# Patient Record
Sex: FEMALE | Race: WHITE | NOT HISPANIC OR LATINO | Employment: OTHER | URBAN - METROPOLITAN AREA
[De-identification: names, ages, dates, MRNs, and addresses within clinical notes are randomized per-mention and may not be internally consistent; named-entity substitution may affect disease eponyms.]

---

## 2023-10-02 ENCOUNTER — OFFICE VISIT (OUTPATIENT)
Dept: FAMILY MEDICINE | Facility: CLINIC | Age: 58
End: 2023-10-02

## 2023-10-02 VITALS
DIASTOLIC BLOOD PRESSURE: 90 MMHG | HEART RATE: 89 BPM | SYSTOLIC BLOOD PRESSURE: 148 MMHG | HEIGHT: 59 IN | BODY MASS INDEX: 40.33 KG/M2 | WEIGHT: 200.06 LBS | OXYGEN SATURATION: 98 %

## 2023-10-02 DIAGNOSIS — T48.5X5A ADVERSE EFFECT OF DECONGESTANT: ICD-10-CM

## 2023-10-02 DIAGNOSIS — I10 PRIMARY HYPERTENSION: ICD-10-CM

## 2023-10-02 DIAGNOSIS — G89.29 CHRONIC PAIN OF RIGHT KNEE: Primary | ICD-10-CM

## 2023-10-02 DIAGNOSIS — M25.561 CHRONIC PAIN OF RIGHT KNEE: Primary | ICD-10-CM
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
## 2023-10-02 DIAGNOSIS — Z78.9 NONSMOKER: ICD-10-CM

## 2023-10-02 PROCEDURE — 99203 OFFICE O/P NEW LOW 30 MIN: CPT | Mod: PBBFAC,PN | Performed by: FAMILY MEDICINE

## 2023-10-02 PROCEDURE — 99999 PR PBB SHADOW E&M-NEW PATIENT-LVL III: CPT | Mod: PBBFAC,,, | Performed by: FAMILY MEDICINE

## 2023-10-02 PROCEDURE — 99203 PR OFFICE/OUTPT VISIT, NEW, LEVL III, 30-44 MIN: ICD-10-PCS | Mod: S$PBB,,, | Performed by: FAMILY MEDICINE

## 2023-10-02 PROCEDURE — 99203 OFFICE O/P NEW LOW 30 MIN: CPT | Mod: S$PBB,,, | Performed by: FAMILY MEDICINE

## 2023-10-02 PROCEDURE — 99999 PR PBB SHADOW E&M-NEW PATIENT-LVL III: ICD-10-PCS | Mod: PBBFAC,,, | Performed by: FAMILY MEDICINE

## 2023-10-02 RX ORDER — GABAPENTIN 600 MG/1
600 TABLET ORAL 3 TIMES DAILY
Qty: 90 TABLET | Refills: 2 | Status: SHIPPED | OUTPATIENT
Start: 2023-10-02 | End: 2023-11-17

## 2023-10-02 NOTE — PROGRESS NOTES
Subjective     Patient ID: Kaylie Madden is a 58 y.o. female.    Chief Complaint: Establish Care    Acute on chronic right knee pain: states gained weight, which is exacerbating her pain. 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  Patient wants to make it clear that she had an addiction to opioids.  She has weaned herself off implants to remain that way    Nasal congestion x 2+ weeks: using Afrin daily for more than 3 days. Using mucinex.  States sinuses get better than the get clogged again, so then she goes back to using Afrin.     Obesity:  Has gained weight since quitting her job and knows that is contributing to pain in her knee.  Patient is interested in managing her diet to achieve weight loss, but is not sure as to what program she should do.    Elevated blood pressure reading:  States blood pressure is most likely elevated due to pain.  States she was on medication in past but was able to come off of it.      Review of Systems   Constitutional:  Negative for activity change, appetite change, chills, diaphoresis, fatigue, fever and unexpected weight change.   HENT:  Positive for nasal congestion.    Respiratory:  Negative for cough, choking and chest tightness.    Cardiovascular:  Negative for chest pain, palpitations, leg swelling and claudication.   Gastrointestinal:  Negative for abdominal pain, change in bowel habit, constipation, diarrhea, nausea and vomiting.   Musculoskeletal:  Positive for arthralgias, leg pain and myalgias.   Psychiatric/Behavioral:  Negative for dysphoric mood, self-injury, sleep disturbance and suicidal ideas. The patient is not nervous/anxious.           Objective     Physical Exam       Assessment and Plan     1. Chronic pain of right knee    2. Adverse effect of decongestant    3. Primary hypertension    4. BMI 40.0-44.9, adult    5. Nonsmoker    6. Injury caused by bending, sequela    Other orders  -     gabapentin (NEURONTIN)  600 MG tablet; Take 1 tablet (600 mg total) by mouth 3 (three) times daily.  Dispense: 90 tablet; Refill: 2      Patient is currently uninsured.  States to have insurance within the next 3 weeks.  We will order x-ray when patient has insurance.  Refilled Neurontin helps his pain in the interim.     discussed dietary modifications and that caloric restrictions are more important that exercise alone as one may never burn off more calories than they are consuming daily. Advised to focus on cutting calories, joining weight watchers and other programs that help get control of dietary intake.    Risks, benefits, and side effects were discussed with the patient. All questions were answered to the fullest satisfaction of the patient, and pt verbalized understanding and agreement to treatment plan. Pt was to call with any new or worsening symptoms, or present to the ER.  Return to clinic once she has insurance to continue care       Maite Mccoy MD  Family Medicine Physician   [facility]     This note was created using M*Modal voice recognition software that occasionally may misinterpret phrases or words.

## 2023-11-17 ENCOUNTER — OFFICE VISIT (OUTPATIENT)
Dept: FAMILY MEDICINE | Facility: CLINIC | Age: 58
End: 2023-11-17

## 2023-11-17 ENCOUNTER — TELEPHONE (OUTPATIENT)
Dept: FAMILY MEDICINE | Facility: CLINIC | Age: 58
End: 2023-11-17

## 2023-11-17 VITALS
OXYGEN SATURATION: 97 % | HEART RATE: 89 BPM | SYSTOLIC BLOOD PRESSURE: 170 MMHG | DIASTOLIC BLOOD PRESSURE: 70 MMHG | HEIGHT: 59 IN | WEIGHT: 196.63 LBS | BODY MASS INDEX: 39.64 KG/M2

## 2023-11-17 DIAGNOSIS — M17.11 PRIMARY OSTEOARTHRITIS OF RIGHT KNEE: ICD-10-CM

## 2023-11-17 DIAGNOSIS — Z78.9 NONSMOKER: ICD-10-CM

## 2023-11-17 DIAGNOSIS — I10 PRIMARY HYPERTENSION: Primary | ICD-10-CM
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
## 2023-11-17 PROCEDURE — 99999 PR PBB SHADOW E&M-EST. PATIENT-LVL III: CPT | Mod: PBBFAC,,, | Performed by: FAMILY MEDICINE

## 2023-11-17 PROCEDURE — 99214 PR OFFICE/OUTPT VISIT, EST, LEVL IV, 30-39 MIN: ICD-10-PCS | Mod: S$PBB,,, | Performed by: FAMILY MEDICINE

## 2023-11-17 PROCEDURE — 99213 OFFICE O/P EST LOW 20 MIN: CPT | Mod: PBBFAC,PN | Performed by: FAMILY MEDICINE

## 2023-11-17 PROCEDURE — 99214 OFFICE O/P EST MOD 30 MIN: CPT | Mod: S$PBB,,, | Performed by: FAMILY MEDICINE

## 2023-11-17 PROCEDURE — 99999 PR PBB SHADOW E&M-EST. PATIENT-LVL III: ICD-10-PCS | Mod: PBBFAC,,, | Performed by: FAMILY MEDICINE

## 2023-11-17 RX ORDER — LISINOPRIL 20 MG/1
20 TABLET ORAL EVERY MORNING
COMMUNITY
End: 2023-11-29

## 2023-11-17 RX ORDER — GABAPENTIN 800 MG/1
800 TABLET ORAL 3 TIMES DAILY
Qty: 90 TABLET | Refills: 5 | Status: SHIPPED | OUTPATIENT
Start: 2023-11-17 | End: 2023-11-29 | Stop reason: SDUPTHER

## 2023-11-17 NOTE — TELEPHONE ENCOUNTER
----- Message from Caro Pastrana sent at 2023  2:29 PM CST -----  Regarding: advise  Contact: patient  Type: Needs Medical Advice    Who Called:  Patient    Symptoms (please be specific):      How long has patient had these symptoms:        Pharmacy name and phone #:      Best Call Back Number: [phone]    Additional Information: went to a  left it out of town ( neurotin ) needs a refill without if causing bp to rise call to advise thanks!

## 2023-11-17 NOTE — PROGRESS NOTES
Subjective     Patient ID: Kaylie Madden is a 58 y.o. female.    Chief Complaint: Discuss blood pressure and SUZY knee pain    HTN: bp has been elevated  lisinopril 20 mg yesterday took a dose today at 7:30. Pt feeling slightly dizzy when she leans over, but no chest pain or sob.     Chronic knee pain & obesity: dieting and trying to lose weight. States gabapentin helps tremendously, but she left her rx out of town last week and pharmacy will not let her fill early.     Obesity: walking 7 miles now, using weights on carnivore diet, but is wondering what type of diet she should be one.       Review of Systems   Constitutional:  Negative for activity change, appetite change, chills, diaphoresis, fatigue, fever and unexpected weight change.   Respiratory:  Negative for cough, choking and chest tightness.    Cardiovascular:  Negative for chest pain, palpitations, leg swelling and claudication.   Gastrointestinal:  Negative for abdominal pain, change in bowel habit, constipation, diarrhea, nausea and vomiting.   Musculoskeletal:  Positive for arthralgias, leg pain and myalgias.   Neurological:  Positive for dizziness.   Psychiatric/Behavioral:  Negative for dysphoric mood, self-injury, sleep disturbance and suicidal ideas. The patient is not nervous/anxious.           Objective     Physical Exam  Vitals reviewed.   Constitutional:       General: She is not in acute distress.     Appearance: Normal appearance. She is normal weight. She is not ill-appearing or toxic-appearing.   Cardiovascular:      Rate and Rhythm: Normal rate and regular rhythm.      Heart sounds: Normal heart sounds.   Pulmonary:      Effort: Pulmonary effort is normal.      Breath sounds: Normal breath sounds.   Musculoskeletal:         General: Tenderness (right knee) present.   Neurological:      General: No focal deficit present.      Mental Status: She is alert and oriented to person, place, and time.      Gait: Gait abnormal (limping.).    Psychiatric:         Mood and Affect: Mood normal.         Behavior: Behavior normal.            Assessment and Plan     1. Primary hypertension    2. Primary osteoarthritis of right knee    3. BMI 39.0-39.9,adult    4. Injury caused by bending, sequela    5. Nonsmoker    Other orders  -     gabapentin (NEURONTIN) 800 MG tablet; Take 1 tablet (800 mg total) by mouth 3 (three) times daily.  Dispense: 90 tablet; Refill: 5      Advised to increase lisinopril to 20 mg daily and send a portal message next week on how blood pressures are running at home.    Increase Neurontin to 800 mg   discussed dietary modifications. Advised to focus on cutting calories, joining weight watchers and other programs that help get control of dietary intake rather then elimination diets as they are harder to sustain.   Patient states she should have insurance at the beginning of next year  Risks, benefits, and side effects were discussed with the patient. All questions were answered to the fullest satisfaction of the patient, and pt verbalized understanding and agreement to treatment plan. Pt was to call with any new or worsening symptoms, or present to the ER.         Maite Mccoy MD  Family Medicine Physician   [facility]     This note was created using M*Taykey voice recognition software that occasionally may misinterpret phrases or words.

## 2023-11-28 ENCOUNTER — TELEPHONE (OUTPATIENT)
Dept: FAMILY MEDICINE | Facility: CLINIC | Age: 58
End: 2023-11-28

## 2023-11-28 NOTE — TELEPHONE ENCOUNTER
Attempted to reach pt 2X. Phone rang multiple times and sounded like someone answered, but no response each time.

## 2023-11-28 NOTE — TELEPHONE ENCOUNTER
----- Message from Michelle Ivory sent at 11/28/2023 12:40 PM CST -----  Type:  Same Day Appointment Request    Caller is requesting a same day appointment.  Caller declined first available appointment listed below.      Name of Caller:  patient  When is the first available appointment?  tomorrow  Symptoms:  knees hurting, elevated BP  Best Call Back Number:  [phone] (home)   Additional Information:   would like to speak to nurse today

## 2023-11-29 ENCOUNTER — TELEPHONE (OUTPATIENT)
Dept: FAMILY MEDICINE | Facility: CLINIC | Age: 58
End: 2023-11-29

## 2023-11-29 ENCOUNTER — OFFICE VISIT (OUTPATIENT)
Dept: FAMILY MEDICINE | Facility: CLINIC | Age: 58
End: 2023-11-29

## 2023-11-29 VITALS
BODY MASS INDEX: 39.72 KG/M2 | HEART RATE: 88 BPM | OXYGEN SATURATION: 100 % | HEIGHT: 59 IN | SYSTOLIC BLOOD PRESSURE: 150 MMHG | DIASTOLIC BLOOD PRESSURE: 90 MMHG | WEIGHT: 197.06 LBS

## 2023-11-29 DIAGNOSIS — M25.561 CHRONIC PAIN OF RIGHT KNEE: ICD-10-CM
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
## 2023-11-29 DIAGNOSIS — Z78.9 NONSMOKER: ICD-10-CM

## 2023-11-29 DIAGNOSIS — G89.29 CHRONIC PAIN OF RIGHT KNEE: ICD-10-CM

## 2023-11-29 DIAGNOSIS — I10 PRIMARY HYPERTENSION: Primary | ICD-10-CM
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
## 2023-11-29 PROCEDURE — 99214 PR OFFICE/OUTPT VISIT, EST, LEVL IV, 30-39 MIN: ICD-10-PCS | Mod: S$PBB,,, | Performed by: FAMILY MEDICINE

## 2023-11-29 PROCEDURE — 99999 PR PBB SHADOW E&M-EST. PATIENT-LVL III: ICD-10-PCS | Mod: PBBFAC,,, | Performed by: FAMILY MEDICINE

## 2023-11-29 PROCEDURE — 99214 OFFICE O/P EST MOD 30 MIN: CPT | Mod: S$PBB,,, | Performed by: FAMILY MEDICINE

## 2023-11-29 PROCEDURE — 99213 OFFICE O/P EST LOW 20 MIN: CPT | Mod: PBBFAC,PN | Performed by: FAMILY MEDICINE

## 2023-11-29 PROCEDURE — 99999 PR PBB SHADOW E&M-EST. PATIENT-LVL III: CPT | Mod: PBBFAC,,, | Performed by: FAMILY MEDICINE

## 2023-11-29 RX ORDER — LISINOPRIL AND HYDROCHLOROTHIAZIDE 12.5; 2 MG/1; MG/1
2 TABLET ORAL DAILY
Qty: 180 TABLET | Refills: 1 | Status: SHIPPED | OUTPATIENT
Start: 2023-11-29

## 2023-11-29 RX ORDER — LISINOPRIL AND HYDROCHLOROTHIAZIDE 12.5; 2 MG/1; MG/1
2 TABLET ORAL DAILY
Qty: 180 TABLET | Refills: 1 | Status: SHIPPED | OUTPATIENT
Start: 2023-11-29 | End: 2023-11-29 | Stop reason: SDUPTHER

## 2023-11-29 RX ORDER — GABAPENTIN 600 MG/1
600 TABLET ORAL DAILY
Qty: 30 TABLET | Refills: 4 | Status: SHIPPED | OUTPATIENT
Start: 2023-11-29 | End: 2023-12-06

## 2023-11-29 RX ORDER — GABAPENTIN 800 MG/1
800 TABLET ORAL NIGHTLY
Qty: 30 TABLET | Refills: 5 | Status: SHIPPED | OUTPATIENT
Start: 2023-11-29 | End: 2023-12-06 | Stop reason: SDUPTHER

## 2023-11-29 RX ORDER — TRAMADOL HYDROCHLORIDE 50 MG/1
50 TABLET ORAL EVERY 12 HOURS PRN
Qty: 6 TABLET | Refills: 0 | Status: SHIPPED | OUTPATIENT
Start: 2023-11-29 | End: 2023-12-06

## 2023-11-29 NOTE — PROGRESS NOTES
Subjective     Patient ID: Kaylie Madden is a 58 y.o. female.    Chief Complaint: SUZY knee pain and Hypertension    Hypertension:  Patient states she is taking 20 mg of lisinopril, but blood pressure remains elevated over 140/90.  Patient denies any chest pain, shortness for breath or difficulty breathing.    Chronic right knee pain:  Patient would like to try to take 600 mg of gabapentin during the day and 800 mg of gabapentin at night.  States gabapentin does help with the pain, but the 800 mg causes too much sedation      Review of Systems   Constitutional:  Negative for activity change, appetite change, chills, diaphoresis, fatigue, fever and unexpected weight change.   HENT:  Negative for nasal congestion.    Respiratory:  Negative for cough, choking and chest tightness.    Cardiovascular:  Negative for chest pain, palpitations, leg swelling and claudication.   Gastrointestinal:  Negative for abdominal pain, change in bowel habit, constipation, diarrhea, nausea and vomiting.   Musculoskeletal:  Positive for arthralgias, leg pain and myalgias.   Psychiatric/Behavioral:  Negative for dysphoric mood, self-injury, sleep disturbance and suicidal ideas. The patient is not nervous/anxious.           Objective     Physical Exam  Vitals reviewed.   Constitutional:       General: She is not in acute distress.     Appearance: Normal appearance. She is normal weight. She is not ill-appearing or toxic-appearing.   Cardiovascular:      Rate and Rhythm: Normal rate and regular rhythm.      Heart sounds: Normal heart sounds.   Pulmonary:      Effort: Pulmonary effort is normal.      Breath sounds: Normal breath sounds.   Musculoskeletal:         General: Tenderness (right knee) present.   Neurological:      General: No focal deficit present.      Mental Status: She is alert and oriented to person, place, and time.      Gait: Gait abnormal (limping.).   Psychiatric:         Mood and Affect: Mood normal.         Behavior:  Behavior normal.            Assessment and Plan     1. Primary hypertension    2. Chronic pain of right knee    3. Injury caused by bending, sequela    Other orders  -     lisinopriL-hydrochlorothiazide (PRINZIDE,ZESTORETIC) 20-12.5 mg per tablet; Take 2 tablets by mouth once daily.  Dispense: 180 tablet; Refill: 1  -     gabapentin (NEURONTIN) 600 MG tablet; Take 1 tablet (600 mg total) by mouth once daily. And take one 800 mg gabapentin at night  Dispense: 30 tablet; Refill: 4  -     gabapentin (NEURONTIN) 800 MG tablet; Take 1 tablet (800 mg total) by mouth nightly. Take 600 mg of gabapentin during the day.  Dispense: 30 tablet; Refill: 5      Stop lisinopril.  We will add Zestoretic.  Continue check blood pressure at home  We will write for 600 mg of Neurontin during the day and 800 mg of Neurontin at bedtime  Risks, benefits, and side effects were discussed with the patient. All questions were answered to the fullest satisfaction of the patient, and pt verbalized understanding and agreement to treatment plan. Pt was to call with any new or worsening symptoms, or present to the ER.  Return to clinic one-week blood pressure check       Maite Mccoy MD  Family Medicine Physician   [facility]     This note was created using M*Senscient voice recognition software that occasionally may misinterpret phrases or words.       Addendum:   reviewed patient has filled a total of 270 tablets gabapentin over the past month.  Advised patient that I had not checked her  this was not aware of the quantity that she had.  Patient states she threw away those bottles.  Advised patient that no pharmacy is going to fill her prescription of Norco with that most recent fill amount.  Patient asked about tramadol.  Advised patient that I did not want to get her addicted to pain medications as she has stated she is been addicted in the past.  Patient states she just wants some to help get the pain down so that she  does not have a stroke or heart attack.  Advised patient that I am treating her blood pressure to hopefully prevent having a stroke or heart attack.  Advised patient that I will write 3 days' worth of tramadol to take p.r.n. severe pain.  Patient's insurance is supposed to become active on Friday we will then place a referral appointment for her to see ortho on Monday.  Patient verbalized understanding

## 2023-11-29 NOTE — ADDENDUM NOTE
Addended by: GIBRAN SÁNCHEZ on: 11/29/2023 04:32 PM     Modules accepted: Orders, Level of Service

## 2023-11-29 NOTE — TELEPHONE ENCOUNTER
----- Message from Lilian Lebron sent at 11/29/2023  4:05 PM CST -----  Type: Needs Medical Advice  Who Called:  pt     Best Call Back Number: [phone] (home)     Additional Information: pt checking on status of prescription please advise       [facility] [place] [address] [address] [facility]  [address] [address] [place] [address]  Phone: [phone] Fax: [phone]

## 2023-12-06 ENCOUNTER — TELEPHONE (OUTPATIENT)
Dept: FAMILY MEDICINE | Facility: CLINIC | Age: 58
End: 2023-12-06

## 2023-12-06 ENCOUNTER — OFFICE VISIT (OUTPATIENT)
Dept: FAMILY MEDICINE | Facility: CLINIC | Age: 58
End: 2023-12-06

## 2023-12-06 VITALS
OXYGEN SATURATION: 98 % | BODY MASS INDEX: 40.07 KG/M2 | HEART RATE: 72 BPM | HEIGHT: 59 IN | WEIGHT: 198.75 LBS | SYSTOLIC BLOOD PRESSURE: 155 MMHG | DIASTOLIC BLOOD PRESSURE: 80 MMHG
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
## 2023-12-06 DIAGNOSIS — I10 PRIMARY HYPERTENSION: Primary | ICD-10-CM

## 2023-12-06 DIAGNOSIS — Z12.31 OTHER SCREENING MAMMOGRAM: ICD-10-CM

## 2023-12-06 DIAGNOSIS — G89.29 CHRONIC PAIN OF RIGHT KNEE: ICD-10-CM

## 2023-12-06 DIAGNOSIS — I10 PRIMARY HYPERTENSION: ICD-10-CM

## 2023-12-06 DIAGNOSIS — Z78.9 NONSMOKER: ICD-10-CM

## 2023-12-06 DIAGNOSIS — M25.561 CHRONIC PAIN OF RIGHT KNEE: ICD-10-CM

## 2023-12-06 PROCEDURE — 99999 PR PBB SHADOW E&M-EST. PATIENT-LVL III: ICD-10-PCS | Mod: PBBFAC,,, | Performed by: FAMILY MEDICINE

## 2023-12-06 PROCEDURE — 99213 OFFICE O/P EST LOW 20 MIN: CPT | Mod: PBBFAC,PN | Performed by: FAMILY MEDICINE

## 2023-12-06 PROCEDURE — 99214 PR OFFICE/OUTPT VISIT, EST, LEVL IV, 30-39 MIN: ICD-10-PCS | Mod: S$PBB,,, | Performed by: FAMILY MEDICINE

## 2023-12-06 PROCEDURE — 99214 OFFICE O/P EST MOD 30 MIN: CPT | Mod: S$PBB,,, | Performed by: FAMILY MEDICINE

## 2023-12-06 PROCEDURE — 99999 PR PBB SHADOW E&M-EST. PATIENT-LVL III: CPT | Mod: PBBFAC,,, | Performed by: FAMILY MEDICINE

## 2023-12-06 RX ORDER — GABAPENTIN 800 MG/1
800 TABLET ORAL 3 TIMES DAILY
Qty: 90 TABLET | Refills: 2 | Status: SHIPPED | OUTPATIENT
Start: 2023-12-08 | End: 2024-01-17

## 2023-12-06 RX ORDER — AMLODIPINE BESYLATE 10 MG/1
10 TABLET ORAL DAILY
Qty: 30 TABLET | Refills: 1 | Status: SHIPPED | OUTPATIENT
Start: 2023-12-06 | End: 2024-12-05

## 2023-12-06 NOTE — PROGRESS NOTES
Subjective     Patient ID: Kaylie Madden is a 58 y.o. female.    Chief Complaint: Follow-up and SUZY knee pain    Follow up    Hypertension: Compliant with Zestoretic.  States her knees hurt her feel much that is probably why her blood pressure is elevated.  States she did try the Ultram and it did not help with pain like gabapentin does.  Patient is here today to see if she can resume gabapentin.  Patient still states that the plan is that she will have commercial insurance by January 1st      Review of Systems   Constitutional:  Negative for activity change, appetite change, chills, diaphoresis, fatigue, fever and unexpected weight change.   HENT:  Negative for nasal congestion.    Respiratory:  Negative for cough, choking and chest tightness.    Cardiovascular:  Negative for chest pain, palpitations, leg swelling and claudication.   Gastrointestinal:  Negative for abdominal pain, change in bowel habit, constipation, diarrhea, nausea and vomiting.   Musculoskeletal:  Positive for arthralgias, leg pain and myalgias.   Psychiatric/Behavioral:  Negative for dysphoric mood, self-injury, sleep disturbance and suicidal ideas. The patient is not nervous/anxious.           Objective     Physical Exam  Vitals reviewed.   Constitutional:       General: She is not in acute distress.     Appearance: Normal appearance. She is normal weight. She is not ill-appearing or toxic-appearing.   Cardiovascular:      Rate and Rhythm: Normal rate and regular rhythm.      Heart sounds: Normal heart sounds.   Pulmonary:      Effort: Pulmonary effort is normal.      Breath sounds: Normal breath sounds.   Musculoskeletal:         General: Tenderness (right knee) present.   Neurological:      General: No focal deficit present.      Mental Status: She is alert and oriented to person, place, and time.      Gait: Gait abnormal (limping.).   Psychiatric:         Mood and Affect: Mood normal.         Behavior: Behavior normal.             Assessment and Plan     1. Primary hypertension    2. Chronic pain of right knee    3. Injury caused by bending, sequela    4. Nonsmoker    Other orders  -     amLODIPine (NORVASC) 10 MG tablet; Take 1 tablet (10 mg total) by mouth once daily.  Dispense: 30 tablet; Refill: 1  -     gabapentin (NEURONTIN) 800 MG tablet; Take 1 tablet (800 mg total) by mouth 3 (three) times daily. Take 600 mg of gabapentin during the day.  Dispense: 90 tablet; Refill: 2      We will add Norvasc to blood pressure regimen   reviewed technically patient would be eligible for refill on Neurontin next Friday we will write prescription for 800 mg today to see if a pharmacy would be willing to fill early.  Advised it will still be up to discretion of the pharmacy as to whether they refill early.  Again plan is to get x-rays and do further interventions once she has insurance on January 1st.  Return to clinic next week for nurse visit blood pressure check   Return to office January 8th to continue care under insurance.  Risks, benefits, and side effects were discussed with the patient. All questions were answered to the fullest satisfaction of the patient, and pt verbalized understanding and agreement to treatment plan. Pt was to call with any new or worsening symptoms, or present to the ER.         Maite Mccoy MD  Family Medicine Physician   [facility]     This note was created using M*Editlite voice recognition software that occasionally may misinterpret phrases or words.

## 2023-12-06 NOTE — TELEPHONE ENCOUNTER
Received message from call center. Pt called asking if she dropped her phone  cord in the office. Checked the room pt was in and the lobby. No cord found. Pt aware.

## 2023-12-06 NOTE — PROGRESS NOTES
Subjective     Patient ID: Kaylie Madden is a 58 y.o. female.    Chief Complaint: Follow-up and SUZY knee pain    HPI  Review of Systems       Objective     Physical Exam       Assessment and Plan     1. Primary hypertension        ***         No follow-ups on file.

## 2023-12-19 ENCOUNTER — OFFICE VISIT (OUTPATIENT)
Dept: FAMILY MEDICINE | Facility: CLINIC | Age: 58
End: 2023-12-19

## 2023-12-19 ENCOUNTER — TELEPHONE (OUTPATIENT)
Dept: FAMILY MEDICINE | Facility: CLINIC | Age: 58
End: 2023-12-19

## 2023-12-19 VITALS
HEIGHT: 59 IN | DIASTOLIC BLOOD PRESSURE: 94 MMHG | WEIGHT: 201.25 LBS | BODY MASS INDEX: 40.57 KG/M2 | OXYGEN SATURATION: 100 % | SYSTOLIC BLOOD PRESSURE: 156 MMHG | HEART RATE: 87 BPM

## 2023-12-19 DIAGNOSIS — M25.561 CHRONIC PAIN OF RIGHT KNEE: ICD-10-CM
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
## 2023-12-19 DIAGNOSIS — Z78.9 NONSMOKER: ICD-10-CM

## 2023-12-19 DIAGNOSIS — I10 PRIMARY HYPERTENSION: Primary | ICD-10-CM

## 2023-12-19 DIAGNOSIS — G89.29 CHRONIC PAIN OF RIGHT KNEE: ICD-10-CM

## 2023-12-19 DIAGNOSIS — M25.562 CHRONIC PAIN OF LEFT KNEE: ICD-10-CM

## 2023-12-19 DIAGNOSIS — G89.29 CHRONIC PAIN OF LEFT KNEE: ICD-10-CM

## 2023-12-19 DIAGNOSIS — Z91.148 NONCOMPLIANCE WITH MEDICATION TREATMENT DUE TO ABUSE OF MEDICATION: ICD-10-CM

## 2023-12-19 PROCEDURE — 99999 PR PBB SHADOW E&M-EST. PATIENT-LVL IV: CPT | Mod: PBBFAC,,, | Performed by: FAMILY MEDICINE

## 2023-12-19 PROCEDURE — 99999 PR PBB SHADOW E&M-EST. PATIENT-LVL IV: ICD-10-PCS | Mod: PBBFAC,,, | Performed by: FAMILY MEDICINE

## 2023-12-19 PROCEDURE — 99214 OFFICE O/P EST MOD 30 MIN: CPT | Mod: S$PBB,,, | Performed by: FAMILY MEDICINE

## 2023-12-19 PROCEDURE — 99214 OFFICE O/P EST MOD 30 MIN: CPT | Mod: PBBFAC,PN | Performed by: FAMILY MEDICINE

## 2023-12-19 PROCEDURE — 99214 PR OFFICE/OUTPT VISIT, EST, LEVL IV, 30-39 MIN: ICD-10-PCS | Mod: S$PBB,,, | Performed by: FAMILY MEDICINE

## 2023-12-19 RX ORDER — GABAPENTIN 400 MG/1
400 CAPSULE ORAL DAILY
Qty: 30 CAPSULE | Refills: 0 | Status: SHIPPED | OUTPATIENT
Start: 2023-12-19 | End: 2024-01-17

## 2023-12-19 RX ORDER — SPIRONOLACTONE 25 MG/1
25 TABLET ORAL DAILY
Qty: 30 TABLET | Refills: 1 | Status: SHIPPED | OUTPATIENT
Start: 2023-12-19 | End: 2024-02-14

## 2023-12-19 NOTE — TELEPHONE ENCOUNTER
Spoke with jude camacho. Paulino states that pt calls asking for early fill on her dog's xanax as well. State she called there this morning asking for refill on neurontin. Advised him to cancel my refills on file as I will not be prescribing to her in future.

## 2023-12-19 NOTE — TELEPHONE ENCOUNTER
Mariela Sesay,  Please review message below from the pharmacist-Paulino Woodard from [employer] concerning this patient.  States you can give him a call on this matter.  Call placed to [facility] due to msg left, spoke w/pharmacist-Paulino Woodard states pt has a substance abuse issues.   Last office visit: 12/19/2023  Thank you.  Signed:  Dio Ann LPN    ----- Message from Sherly Olivas sent at 12/19/2023  9:53 AM CST -----  Contact: [facility]  Type:  Pharmacy Calling to Clarify an RX    Name of Caller:PAULINO   Pharmacy Name: [facility]  Prescription Name: gabapentin (NEURONTIN) 800 MG tablet  What do they need to clarify?: PT  HAS RX FILLED AT MULTIPLE PHARMACIES AND IS NOT BEING TRUTHFUL WITH THE PHARMACY. PT HAS SUBSTANCE ABUSE ISSUES AND IS BELIEVED TO ALSO BE TAKING XANAX. PHARMACY RECOMMENDS DRUG TESTING  PHARMACY HAD THIS ISSUE WITH THE PT IN THE PAST   Best Call Back Number: [phone]   Additional Information: THANK YOU

## 2023-12-20 ENCOUNTER — PATIENT MESSAGE (OUTPATIENT)
Dept: FAMILY MEDICINE | Facility: CLINIC | Age: 58
End: 2023-12-20

## 2023-12-21 PROBLEM — Z91.148 NONCOMPLIANCE WITH MEDICATION TREATMENT DUE TO ABUSE OF MEDICATION: Status: ACTIVE | Noted: 2023-12-21

## 2023-12-21 NOTE — PROGRESS NOTES
Subjective     Patient ID: Kaylie Madden is a 58 y.o. female.    Chief Complaint: Hypertension and Foot Swelling    Hypertension and chronic pain syndrome:  Patient states she has been compliant with her blood pressure medications, but she is still in a lot of pain to her blood pressure remains elevated.  States she must a red her instructions on her gabapentin wrong because she has gone through 90 tablets in 2 weeks.  Patient requesting additional refill of gabapentin.  Patient states she is scared also going through withdrawal.    Obesity:  Patient interested in weight loss management and would prefer to stay with a PCP      Review of Systems   Constitutional:  Negative for activity change, appetite change, chills, diaphoresis, fatigue, fever and unexpected weight change.   HENT:  Negative for nasal congestion.    Respiratory:  Negative for cough, choking and chest tightness.    Cardiovascular:  Negative for chest pain, palpitations, leg swelling and claudication.   Gastrointestinal:  Negative for abdominal pain, change in bowel habit, constipation, diarrhea, nausea and vomiting.   Musculoskeletal:  Positive for arthralgias, leg pain and myalgias.   Psychiatric/Behavioral:  Negative for dysphoric mood, self-injury, sleep disturbance and suicidal ideas. The patient is not nervous/anxious.           Objective     Physical Exam  Vitals reviewed.   Constitutional:       General: She is not in acute distress.     Appearance: Normal appearance. She is not ill-appearing or toxic-appearing.   Cardiovascular:      Rate and Rhythm: Normal rate and regular rhythm.      Heart sounds: Normal heart sounds.   Pulmonary:      Effort: Pulmonary effort is normal.      Breath sounds: Normal breath sounds.   Musculoskeletal:         General: Tenderness (right knee) present.   Neurological:      General: No focal deficit present.      Mental Status: She is alert and oriented to person, place, and time.      Gait: Gait abnormal  (limping.).   Psychiatric:         Mood and Affect: Mood normal.         Behavior: Behavior normal.            Assessment and Plan     1. Primary hypertension    2. Chronic pain of right knee  -     Ambulatory referral/consult to Orthopedics; Future; Expected date: 01/08/2024  -     X-Ray Knee 3 View Right; Future; Expected date: 12/19/2023    3. Injury caused by bending, sequela    4. BMI 40.0-44.9, adult    5. Nonsmoker    6. Chronic pain of left knee  -     X-Ray Knee 3 View Left; Future; Expected date: 12/19/2023    7. Noncompliance with medication treatment due to abuse of medication    Other orders  -     gabapentin (NEURONTIN) 400 MG capsule; Take 1 capsule (400 mg total) by mouth once daily.  Dispense: 30 capsule; Refill: 0  -     spironolactone (ALDACTONE) 25 MG tablet; Take 1 tablet (25 mg total) by mouth once daily.  Dispense: 30 tablet; Refill: 1      Advised patient that I will not be writing gabapentin for her anymore.  Advised that given her history of medication abuse she is demonstrating signs of that with gabapentin.  Advised that I understand that she has chronic pain that needs to be addressed, but she might have to wait until she is able to establish with ortho for possible knee in injections, or other interventional approaches as the medication route is leading her down a slippery slope again.  Advised that I will write 400 mg of gabapentin but only a 30 day supply.  Advised her to make those stretch to prevent a withdrawal, but going forward she will have to go without gabapentin.  Advised patient that I do not do weight management, but I can have her establish with a provider at our [place] location to do weight management and continue her care.  Again advised patient that the plan does not change that when she gets insurance in January 1st she should follow up with ortho in obtain x-rays of her knees so that we can continue her care   We will add blood pressure medication   Risks, benefits,  and side effects were discussed with the patient. All questions were answered to the fullest satisfaction of the patient, and pt verbalized understanding and agreement to treatment plan. Pt was to call with any new or worsening symptoms, or present to the ER.  Advised to return to clinic 1 2 week nurse visit blood pressure check       Maite Mccoy MD  Family Medicine Physician   [facility]     This note was created using M*Modal voice recognition software that occasionally may misinterpret phrases or words.

## 2023-12-28 ENCOUNTER — HOSPITAL ENCOUNTER (OUTPATIENT)
Dept: RADIOLOGY | Facility: HOSPITAL | Age: 58
Discharge: HOME | End: 2023-12-28
Attending: FAMILY MEDICINE

## 2023-12-28 DIAGNOSIS — M25.562 CHRONIC PAIN OF LEFT KNEE: ICD-10-CM

## 2023-12-28 DIAGNOSIS — M25.561 CHRONIC PAIN OF RIGHT KNEE: ICD-10-CM

## 2023-12-28 DIAGNOSIS — G89.29 CHRONIC PAIN OF RIGHT KNEE: ICD-10-CM

## 2023-12-28 DIAGNOSIS — Z12.31 OTHER SCREENING MAMMOGRAM: ICD-10-CM

## 2023-12-28 DIAGNOSIS — G89.29 CHRONIC PAIN OF LEFT KNEE: ICD-10-CM

## 2023-12-28 PROCEDURE — 77067 SCR MAMMO BI INCL CAD: CPT | Mod: 26,,, | Performed by: RADIOLOGY

## 2023-12-28 PROCEDURE — 73562 X-RAY EXAM OF KNEE 3: CPT | Mod: 26,RT,, | Performed by: RADIOLOGY

## 2023-12-28 PROCEDURE — 77067 MAMMO DIGITAL SCREENING BILAT WITH TOMO: ICD-10-PCS | Mod: 26,,, | Performed by: RADIOLOGY

## 2023-12-28 PROCEDURE — 73562 X-RAY EXAM OF KNEE 3: CPT | Mod: TC,RT

## 2023-12-28 PROCEDURE — 73562 X-RAY EXAM OF KNEE 3: CPT | Mod: TC,LT

## 2023-12-28 PROCEDURE — 77063 BREAST TOMOSYNTHESIS BI: CPT | Mod: 26,,, | Performed by: RADIOLOGY

## 2023-12-28 PROCEDURE — 77067 SCR MAMMO BI INCL CAD: CPT | Mod: TC

## 2023-12-28 PROCEDURE — 73562 X-RAY EXAM OF KNEE 3: CPT | Mod: 26,LT,, | Performed by: RADIOLOGY

## 2023-12-28 PROCEDURE — 77063 MAMMO DIGITAL SCREENING BILAT WITH TOMO: ICD-10-PCS | Mod: 26,,, | Performed by: RADIOLOGY

## 2023-12-28 PROCEDURE — 73562 XR KNEE 3 VIEW LEFT: ICD-10-PCS | Mod: 26,LT,, | Performed by: RADIOLOGY

## 2024-01-04 ENCOUNTER — TELEPHONE (OUTPATIENT)
Dept: FAMILY MEDICINE | Facility: CLINIC | Age: 59
End: 2024-01-04
Payer: COMMERCIAL

## 2024-01-04 ENCOUNTER — OFFICE VISIT (OUTPATIENT)
Dept: ORTHOPEDICS | Facility: CLINIC | Age: 59
End: 2024-01-04
Payer: COMMERCIAL

## 2024-01-04 VITALS — OXYGEN SATURATION: 99 % | HEART RATE: 86 BPM | HEIGHT: 59 IN | BODY MASS INDEX: 40.52 KG/M2 | WEIGHT: 201 LBS

## 2024-01-04 DIAGNOSIS — M25.561 CHRONIC PAIN OF RIGHT KNEE: ICD-10-CM

## 2024-01-04 DIAGNOSIS — G89.29 CHRONIC PAIN OF RIGHT KNEE: ICD-10-CM

## 2024-01-04 DIAGNOSIS — M17.0 PRIMARY OSTEOARTHRITIS OF BOTH KNEES: Primary | ICD-10-CM

## 2024-01-04 PROCEDURE — 99999 PR PBB SHADOW E&M-EST. PATIENT-LVL IV: CPT | Mod: PBBFAC,,, | Performed by: ORTHOPAEDIC SURGERY

## 2024-01-04 PROCEDURE — 99204 OFFICE O/P NEW MOD 45 MIN: CPT | Mod: 25,S$GLB,, | Performed by: ORTHOPAEDIC SURGERY

## 2024-01-04 PROCEDURE — 1160F RVW MEDS BY RX/DR IN RCRD: CPT | Mod: CPTII,S$GLB,, | Performed by: ORTHOPAEDIC SURGERY

## 2024-01-04 PROCEDURE — 1159F MED LIST DOCD IN RCRD: CPT | Mod: CPTII,S$GLB,, | Performed by: ORTHOPAEDIC SURGERY

## 2024-01-04 PROCEDURE — 20610 DRAIN/INJ JOINT/BURSA W/O US: CPT | Mod: RT,S$GLB,, | Performed by: ORTHOPAEDIC SURGERY

## 2024-01-04 PROCEDURE — 3008F BODY MASS INDEX DOCD: CPT | Mod: CPTII,S$GLB,, | Performed by: ORTHOPAEDIC SURGERY

## 2024-01-04 RX ORDER — TRIAMCINOLONE ACETONIDE 40 MG/ML
40 INJECTION, SUSPENSION INTRA-ARTICULAR; INTRAMUSCULAR
Status: DISCONTINUED | OUTPATIENT
Start: 2024-01-04 | End: 2024-01-04 | Stop reason: HOSPADM

## 2024-01-04 RX ORDER — MELOXICAM 15 MG/1
15 TABLET ORAL DAILY
Qty: 30 TABLET | Refills: 1 | Status: SHIPPED | OUTPATIENT
Start: 2024-01-04 | End: 2024-03-04

## 2024-01-04 RX ADMIN — TRIAMCINOLONE ACETONIDE 40 MG: 40 INJECTION, SUSPENSION INTRA-ARTICULAR; INTRAMUSCULAR at 11:01

## 2024-01-04 NOTE — PROCEDURES
Large Joint Aspiration/Injection: R knee    Date/Time: 1/4/2024 11:30 AM    Performed by: Andrey Negron MD  Authorized by: Andrey Negron MD    Consent Done?:  Yes (Verbal)  Indications:  Pain  Site marked: the procedure site was marked    Timeout: prior to procedure the correct patient, procedure, and site was verified    Local anesthetic: Ropivicaine.  Anesthetic total (ml):  3      Details:  Needle Size:  20 G  Approach:  Anterolateral  Location:  Knee  Site:  R knee  Medications:  40 mg triamcinolone acetonide 40 mg/mL  Patient tolerance:  Patient tolerated the procedure well with no immediate complications

## 2024-01-04 NOTE — LETTER
January 4, 2024      [facility]  [address]  [place] [address]  Phone: [phone]  Fax: [phone]       Patient: Kaylie Madden   YOB: 1965  Date of Visit: 01/04/2024    To Whom It May Concern:    Hermann Madden  was at [facility] on 01/04/2024. The patient may return to work on 01/05/2024 with no restrictions. If you have any questions or concerns, or if I can be of further assistance, please do not hesitate to contact me.    Sincerely,          Yolis Newby LPN

## 2024-01-04 NOTE — TELEPHONE ENCOUNTER
----- Message from Kristian Mckeon sent at 1/4/2024 12:14 PM CST -----  Contact: pt  Type:  Same Day Appointment Request    Caller is requesting a same day appointment.  Caller declined first available appointment listed below.    Name of Caller the patient    When is the first available appointment?1/10  Symptoms:knee pain , sciatica  Best Call Back Number:[phone]   Additional Information: Thanks

## 2024-01-04 NOTE — PROGRESS NOTES
Subjective:      Patient ID: Kaylie Madden is a 58 y.o. female.    Chief Complaint: Pain of the Right Knee and Pain of the Left Knee    HPI  58-year-old female with a long history of bilateral knee pain right greater than left.  Recently seen by her PCP and referred for further evaluation.  Symptoms fail to improve with gabapentin.  She does have some sciatic issues as well.  Her pain is with prolonged standing and walking.  Feels like her knee is going to buckle or give way at times.  Describes an 8/10 level pain.  ROS      Objective:    Ortho Exam     Constitutional:   Patient is alert  and oriented in no acute distress  HEENT:  normocephalic atraumatic; PERRL EOMI  Neck:  Supple without adenopathy  Cardiovascular:  Normal rate and rhythm  Pulmonary:  Normal respiratory effort normal chest wall expansion  Abdominal:  Nonprotuberant nondistended  Musculoskeletal:  Patient has a mildly antalgic gait  She has significant patellofemoral crepitus and diffuse joint line tenderness  Maybe a trace effusion there is no gross instability  Neurological:  No focal defect; cranial nerves 2-12 grossly intact  Psychiatric/behavioral:  Mood and behavior normal           My Radiographs Findings:    Radiographs consistent with moderate to severe degenerative changes with joint space narrowing subchondral sclerosis and osteophyte formation  Assessment:       Encounter Diagnoses   Name Primary?    Chronic pain of right knee     Primary osteoarthritis of both knees Yes         Plan:       I have discussed medical condition treatment options at length.  After a verbal consent and sterile prep I injected her most symptomatic right knee today without complication with a combination of cc Kenalog and several cc of lidocaine.  I have discussed generalized activity restrictions knee rehab exercises I will give her a trial of Mobic GI cardiac and renal precautions were discussed.  Additional medications per PCP P as she requests.  Follow up  in 4-6 weeks sooner if any questions or problems.  Long-term likely candidate for knee replacements.        History reviewed. No pertinent past medical history.  Past Surgical History:   Procedure Laterality Date     SECTION      FOOT SURGERY           Current Outpatient Medications:     amLODIPine (NORVASC) 10 MG tablet, Take 1 tablet (10 mg total) by mouth once daily., Disp: 30 tablet, Rfl: 1    gabapentin (NEURONTIN) 400 MG capsule, Take 1 capsule (400 mg total) by mouth once daily., Disp: 30 capsule, Rfl: 0    gabapentin (NEURONTIN) 800 MG tablet, Take 1 tablet (800 mg total) by mouth 3 (three) times daily. Take 600 mg of gabapentin during the day., Disp: 90 tablet, Rfl: 2    ibuprofen (ADVIL,MOTRIN) 200 MG tablet, Take 2 tablets (400 mg total) by mouth every 6 (six) hours as needed for Pain., Disp: 30 tablet, Rfl: 0    lisinopriL-hydrochlorothiazide (PRINZIDE,ZESTORETIC) 20-12.5 mg per tablet, Take 2 tablets by mouth once daily., Disp: 180 tablet, Rfl: 1    spironolactone (ALDACTONE) 25 MG tablet, Take 1 tablet (25 mg total) by mouth once daily., Disp: 30 tablet, Rfl: 1    Review of patient's allergies indicates:   Allergen Reactions    Metaxalone Nausea And Vomiting    Cephalexin Other (See Comments)    Corticosteroids (glucocorticoids) Other (See Comments)     rage       Family History   Problem Relation Age of Onset    Diabetes Mother     Hypertension Mother      Social History     Occupational History    Not on file   Tobacco Use    Smoking status: Never     Passive exposure: Never    Smokeless tobacco: Never   Substance and Sexual Activity    Alcohol use: No    Drug use: No    Sexual activity: Yes     Partners: Male     Birth control/protection: None

## 2024-01-10 ENCOUNTER — TELEPHONE (OUTPATIENT)
Dept: FAMILY MEDICINE | Facility: CLINIC | Age: 59
End: 2024-01-10
Payer: COMMERCIAL

## 2024-01-10 NOTE — TELEPHONE ENCOUNTER
----- Message from Kasandra Goldstein sent at 1/10/2024  9:22 AM CST -----  Regarding: rt call  Contact: pt  Type:  Patient Returning Call    Who Called:pt  Who Left Message for Patient:Dio SALAZAR  Does the patient know what this is regarding?:yes  Would the patient rather a call back or a response via [facility]? Call back  Best Call Back Number:[phone]    Additional Information: sts she is returning a call

## 2024-01-10 NOTE — TELEPHONE ENCOUNTER
Message sent to  Clinic that patient is running late for appointment.  ----- Message from Shakira Goldstein sent at 1/10/2024  8:40 AM CST -----  Contact: PT  Type: Needs Medical Advice    Who Called: PT  Best Call Back Number: [phone]  Additional  Information: PT requesting a call back regarding she missed her exit and will be late wants to know if she can still  be seen. Also pt's insurance is out of network states info was not disclosed to her.  Please Advise- Thank you

## 2024-01-11 ENCOUNTER — TELEPHONE (OUTPATIENT)
Dept: FAMILY MEDICINE | Facility: CLINIC | Age: 59
End: 2024-01-11
Payer: COMMERCIAL

## 2024-01-11 NOTE — TELEPHONE ENCOUNTER
----- Message from Sherly Olivas sent at 1/11/2024 11:15 AM CST -----  Contact: PT  Type:  NURSE APPT REQUEST     Who Called: PT   Symptoms (please be specific): PT WAS INSTRUCTED BY [facility] TO HAVE STICHES REMOVED IN 5 DAYS. STICHES WERE PUT IN ON MONDAY 01/08/23  PT NEEDS A APPT TO REMOVE STICHES   IF UNABLE  TO CHERYL AN APPT WITHIN THE TIME FRAME PT WOULD LIKE TO KNOW IF SHE CAN REMOVE THE STICHES HERSELF?   Would the patient rather a call back or a response via [facility]? CALL   Best Call Back Number: [phone] (home)   Additional Information: THANK YOU

## 2024-01-12 ENCOUNTER — OFFICE VISIT (OUTPATIENT)
Dept: FAMILY MEDICINE | Facility: CLINIC | Age: 59
End: 2024-01-12
Payer: COMMERCIAL

## 2024-01-12 VITALS
TEMPERATURE: 98 F | HEART RATE: 71 BPM | HEIGHT: 59 IN | BODY MASS INDEX: 38.38 KG/M2 | OXYGEN SATURATION: 95 % | DIASTOLIC BLOOD PRESSURE: 86 MMHG | SYSTOLIC BLOOD PRESSURE: 144 MMHG | WEIGHT: 190.38 LBS

## 2024-01-12 DIAGNOSIS — Z48.89 SUTURE CHECK: Primary | ICD-10-CM

## 2024-01-12 PROCEDURE — 1159F MED LIST DOCD IN RCRD: CPT | Mod: CPTII,S$GLB,, | Performed by: STUDENT IN AN ORGANIZED HEALTH CARE EDUCATION/TRAINING PROGRAM

## 2024-01-12 PROCEDURE — 1160F RVW MEDS BY RX/DR IN RCRD: CPT | Mod: CPTII,S$GLB,, | Performed by: STUDENT IN AN ORGANIZED HEALTH CARE EDUCATION/TRAINING PROGRAM

## 2024-01-12 PROCEDURE — 3008F BODY MASS INDEX DOCD: CPT | Mod: CPTII,S$GLB,, | Performed by: STUDENT IN AN ORGANIZED HEALTH CARE EDUCATION/TRAINING PROGRAM

## 2024-01-12 PROCEDURE — 99213 OFFICE O/P EST LOW 20 MIN: CPT | Mod: S$GLB,,, | Performed by: STUDENT IN AN ORGANIZED HEALTH CARE EDUCATION/TRAINING PROGRAM

## 2024-01-12 PROCEDURE — 3077F SYST BP >= 140 MM HG: CPT | Mod: CPTII,S$GLB,, | Performed by: STUDENT IN AN ORGANIZED HEALTH CARE EDUCATION/TRAINING PROGRAM

## 2024-01-12 PROCEDURE — 3079F DIAST BP 80-89 MM HG: CPT | Mod: CPTII,S$GLB,, | Performed by: STUDENT IN AN ORGANIZED HEALTH CARE EDUCATION/TRAINING PROGRAM

## 2024-01-12 NOTE — PROGRESS NOTES
[facility] - [place], [place]    Family Medicine Office Visit    Subjective     Patient ID: Kaylie Madden is a 58 y.o. female who presents to clinic today to follow up.     Medical history, surgical history, medications, allergies, and social history were reviewed and updated.     Chief Complaint: Follow-up (Suture removal )    HPI    Suture check  Fell at work on 1/8 and had an injury to her finger. Went to [facility] ED. Needed to have stiches out in 5 days per ER physician. X-rays showed a small fracture of ring finger. Has been wearing a splint.     Vitals:    01/12/24 1039   BP: (!) 144/86   Pulse: 71   Temp: 97.5 °F (36.4 °C)      Wt Readings from Last 3 Encounters:   01/12/24 1039 86.4 kg (190 lb 6.4 oz)   01/04/24 1139 91.2 kg (201 lb)   12/19/23 0755 91.3 kg (201 lb 4.5 oz)      Review of Systems    Review of Systems otherwise negative unless specified above.        Objective     Physical Exam  Constitutional:       General: She is not in acute distress.     Appearance: Normal appearance. She is obese.   HENT:      Head: Normocephalic and atraumatic.      Mouth/Throat:      Mouth: Mucous membranes are moist.   Cardiovascular:      Rate and Rhythm: Normal rate and regular rhythm.      Heart sounds: No murmur heard.  Pulmonary:      Effort: Pulmonary effort is normal. No respiratory distress.      Breath sounds: Normal breath sounds. No wheezing.   Musculoskeletal:      Right hand: Normal range of motion. Normal strength.      Left hand: No bony tenderness. Decreased range of motion.      Comments: Three sutures in place on left ring finger.    Skin:     General: Skin is warm and dry.   Neurological:      General: No focal deficit present.      Mental Status: She is alert and oriented to person, place, and time.   Psychiatric:         Behavior: Behavior normal.            Assessment and Plan     Current Outpatient Medications   Medication Instructions    amLODIPine (NORVASC) 10  mg, Oral, Daily    gabapentin (NEURONTIN) 800 mg, Oral, 3 times daily, Take 600 mg of gabapentin during the day.    gabapentin (NEURONTIN) 400 mg, Oral, Daily    ibuprofen (ADVIL,MOTRIN) 400 mg, Oral, Every 6 hours PRN    lisinopriL-hydrochlorothiazide (PRINZIDE,ZESTORETIC) 20-12.5 mg per tablet 2 tablets, Oral, Daily    meloxicam (MOBIC) 15 mg, Oral, Daily    spironolactone (ALDACTONE) 25 mg, Oral, Daily        1. Suture check        I do not believe that her sutures are ready for removal today.  I believe that she needs a few more days for her finger to continue to heal.  Recommended that she keep the med until her appointment with Dr. Mccoy next week.  Discussed that if she feels that she has not regained full mobility of her hand we could consider occupational therapy versus referral to hand specialist as she may have a fracture on her ring finger.  Reports she has broken this finger before.         Follow up if symptoms worsen or fail to improve, for Keep scheduled appointment with Dr. Mccoy.    Questions were invited and answered. No other acute concerns at this time. Will plan to follow up as above or sooner if needed.     Sade Stroud DO  01/12/2024 10:45 AM

## 2024-01-12 NOTE — PATIENT INSTRUCTIONS
Rakan Lott,     If you are due for any health screening(s) below please notify me so we can arrange them to be ordered and scheduled. Most healthy patients at your age complete them, but you are free to accept or refuse.     If you can't do it, I'll definitely understand. If you can, I'd certainly appreciate it!    Tests to Keep You Healthy    Mammogram: Met on 12/28/2023  Colon Cancer Screening: DUE  Cervical Cancer Screening: DUE  Last Blood Pressure <= 139/89 (1/12/2024): NO      Its time for your colon cancer screening     Colorectal cancer is one of the leading causes of cancer death for men and women but it doesnt have to be. Screenings can prevent colorectal cancer or find it early enough to treat and cure the disease.     Our records indicate that you may be overdue for colon cancer screening. A colonoscopy or stool screening test can help identify patients at risk for developing colon cancer. Cancer screenings save lives, so schedule yours today to stay healthy.     A colonoscopy is the preferred test for detecting colon cancer. It is needed only once every 10 years if results are negative. While you are sedated, a flexible, lighted tube with a tiny camera is inserted into the rectum and advanced through the colon to look for cancers.     An alternative screening test that is used at home and returned to the lab may also be used. It detects hidden blood in bowel movements which could indicate cancer in the colon. If results are positive, you will need a colonoscopy to determine if the blood is a sign of cancer. This type of follow up (diagnostic) colonoscopy usually requires additional copays as required by your insurance provider.     If you recently had your colon cancer screening performed outside of [facility], please let your Health care team know so that they can update your health record. Please contact your PCP if you have any questions.    Your cervical cancer screening is due     Our  records indicate that you may be overdue for your screening Pap smear. A Pap smear is an important health screening that can detect abnormal cells that can become cervical cancer. Cervical cancer screenings allow for early diagnosis and increase the likelihood of successful treatment.     The current recommendation for Pap smear screening is every 3-5 years for women at average risk. We encourage you to schedule your appointment with your [facility] provider. Many women see a gynecologist for this screening, but some primary care providers also provide Pap screening.     If you recently had your Pap smear screening performed outside of [facility], please let your health care team know so that they can update your health record.      Lets manage your high blood pressure     Your blood pressure was above 140/90 today during your visit. We recommend that you schedule a nurse visit in two weeks to check your blood pressure and discuss ways to support your health goals.     You can also manage your health and record your blood pressure from the comfort of home by keeping a daily blood pressure log. These results are shared with and reviewed by your provider. Please print this form (Daily Blood Pressure Log) to assist you in keeping track of your blood pressure at home.     Schedule your nurse visit in two weeks to learn more about how to track and manage high blood pressure.    Daily Blood Pressure Log    Name:__________________________________                  Date of Birth:_________    Average Blood Pressure:  __________      Date: Time  (a.m.) Blood  Pressure: Pulse  Rate: Time  (p.m.) Blood  Pressure : Pulse  Rate:   Sample 8:37 127/83 84

## 2024-01-17 ENCOUNTER — OFFICE VISIT (OUTPATIENT)
Dept: FAMILY MEDICINE | Facility: CLINIC | Age: 59
End: 2024-01-17
Payer: COMMERCIAL

## 2024-01-17 VITALS
WEIGHT: 185.06 LBS | DIASTOLIC BLOOD PRESSURE: 70 MMHG | OXYGEN SATURATION: 99 % | HEART RATE: 84 BPM | HEIGHT: 59 IN | SYSTOLIC BLOOD PRESSURE: 132 MMHG | BODY MASS INDEX: 37.31 KG/M2

## 2024-01-17 DIAGNOSIS — G89.29 CHRONIC PAIN OF RIGHT KNEE: ICD-10-CM

## 2024-01-17 DIAGNOSIS — Z11.59 ENCOUNTER FOR HEPATITIS C SCREENING TEST FOR LOW RISK PATIENT: ICD-10-CM

## 2024-01-17 DIAGNOSIS — Z13.1 SCREENING FOR DIABETES MELLITUS: ICD-10-CM

## 2024-01-17 DIAGNOSIS — M25.561 CHRONIC PAIN OF RIGHT KNEE: ICD-10-CM

## 2024-01-17 DIAGNOSIS — Z12.11 SCREENING FOR COLON CANCER: ICD-10-CM

## 2024-01-17 DIAGNOSIS — Z01.419 ENCOUNTER FOR WELL WOMAN EXAM WITH ROUTINE GYNECOLOGICAL EXAM: Primary | ICD-10-CM

## 2024-01-17 DIAGNOSIS — I10 PRIMARY HYPERTENSION: Primary | ICD-10-CM

## 2024-01-17 DIAGNOSIS — Z48.02 VISIT FOR SUTURE REMOVAL: ICD-10-CM

## 2024-01-17 DIAGNOSIS — Z78.9 NONSMOKER: ICD-10-CM

## 2024-01-17 DIAGNOSIS — Z13.220 SCREENING FOR LIPID DISORDERS: ICD-10-CM

## 2024-01-17 PROCEDURE — 3008F BODY MASS INDEX DOCD: CPT | Mod: CPTII,S$GLB,, | Performed by: FAMILY MEDICINE

## 2024-01-17 PROCEDURE — 3078F DIAST BP <80 MM HG: CPT | Mod: CPTII,S$GLB,, | Performed by: FAMILY MEDICINE

## 2024-01-17 PROCEDURE — 3075F SYST BP GE 130 - 139MM HG: CPT | Mod: CPTII,S$GLB,, | Performed by: FAMILY MEDICINE

## 2024-01-17 PROCEDURE — 99214 OFFICE O/P EST MOD 30 MIN: CPT | Mod: S$GLB,,, | Performed by: FAMILY MEDICINE

## 2024-01-17 PROCEDURE — 1160F RVW MEDS BY RX/DR IN RCRD: CPT | Mod: CPTII,S$GLB,, | Performed by: FAMILY MEDICINE

## 2024-01-17 PROCEDURE — 1159F MED LIST DOCD IN RCRD: CPT | Mod: CPTII,S$GLB,, | Performed by: FAMILY MEDICINE

## 2024-01-17 PROCEDURE — 99024 POSTOP FOLLOW-UP VISIT: CPT | Mod: S$GLB,,, | Performed by: FAMILY MEDICINE

## 2024-01-17 PROCEDURE — 99999 PR PBB SHADOW E&M-EST. PATIENT-LVL III: CPT | Mod: PBBFAC,,, | Performed by: FAMILY MEDICINE

## 2024-01-17 NOTE — PROCEDURES
Suture Removal    Date/Time: 1/17/2024 9:45 AM  Location procedure was performed: formerly [facility]    Performed by: Maite Mccoy MD  Authorized by: Maite Mccoy MD  Assisting provider: Maite Mccoy MD  Pre-operative diagnosis: laceration of hand sutre removal  Post-operative diagnosis: laceration of hand suture removal  Body area: upper extremity  Location details: right index finger  Description of findings: wound edges approximated. dry blood on suture site   Wound Appearance: no drainage, nonpurulent and tender  Sutures Removed: 3  Technical procedures used: n/a  Significant surgical tasks conducted by the assistant(s): n/a  Complications: No  Estimated blood loss (mL): 0  Specimens: No  Implants: No  Patient tolerance: Patient tolerated the procedure well with no immediate complications

## 2024-01-17 NOTE — PROGRESS NOTES
Subjective     Patient ID: Kaylie Madden is a 58 y.o. female.    Chief Complaint: Follow-up (Pt is here for a follow up visit, Fell at work and had to have stiches put in  left ring finger-- stitches have been in now for 10 days. )    HTN: states spironolactone makes her dizzy she takes it in the morning. Takes 3 bp pills in the morning and one at night.     OA knees and chronic pain syndrome: pt states she threw out     Obesity: pt states she's been working on diet and exercise modifications on her own.     ROS: pt states she had pain between both shoulder blades. Nausea and dizziness. States the whole morning           Review of Systems   Constitutional:  Negative for activity change, appetite change, chills, diaphoresis, fatigue, fever and unexpected weight change.   HENT:  Negative for nasal congestion.    Respiratory:  Negative for cough, choking and chest tightness.    Cardiovascular:  Negative for chest pain, palpitations, leg swelling and claudication.   Gastrointestinal:  Negative for abdominal pain, change in bowel habit, constipation, diarrhea, nausea and vomiting.   Musculoskeletal:  Positive for arthralgias, leg pain and myalgias.   Neurological:  Positive for dizziness.   Psychiatric/Behavioral:  Negative for dysphoric mood, self-injury, sleep disturbance and suicidal ideas. The patient is not nervous/anxious.           Objective     Physical Exam  Vitals reviewed.   Constitutional:       General: She is not in acute distress.     Appearance: Normal appearance. She is not ill-appearing or toxic-appearing.   Cardiovascular:      Rate and Rhythm: Normal rate.   Pulmonary:      Effort: Pulmonary effort is normal. No respiratory distress.   Musculoskeletal:        Hands:       Comments: 3 interrupted sutures in left index finger   Neurological:      General: No focal deficit present.      Mental Status: She is alert and oriented to person, place, and time.   Psychiatric:         Mood and Affect: Mood  normal.         Behavior: Behavior normal.            Assessment and Plan     1. Primary hypertension  -     CBC Auto Differential; Future; Expected date: 01/17/2024    2. Nonsmoker    3. Chronic pain of right knee    4. Encounter for hepatitis C screening test for low risk patient  -     Hepatitis C Antibody; Future; Expected date: 01/17/2024    5. Screening for lipid disorders  -     Lipid Panel; Future; Expected date: 01/17/2024    6. BMI 37.0-37.9, adult  -     Lipid Panel; Future; Expected date: 01/17/2024  -     Hemoglobin A1C; Future; Expected date: 01/17/2024    7. Screening for colon cancer  -     Fecal Immunochemical Test (iFOBT); Future; Expected date: 01/17/2024    8. Screening for diabetes mellitus  -     Hemoglobin A1C; Future; Expected date: 01/17/2024    9. Visit for suture removal  -     Suture Removal      We will check labs today   We will refer to gyn for annual Pap smear   We will give patient colon cancer screening fit kit   Advised patient to separate her blood pressure pills to take amlodipine and Zestoretic in the morning and spironolactone and Zestoretic in the evening to see if that reduces the dizziness   Advised patient that I will not refill her gabapentin at this time due to her recent miss handling of gabapentin and her history of drug abuse.  Advised that she can continue to follow with ortho for management of her pain  Risks, benefits, and side effects were discussed with the patient. All questions were answered to the fullest satisfaction of the patient, and pt verbalized understanding and agreement to treatment plan. Pt was to call with any new or worsening symptoms, or present to the ER.  Return to clinic 3 months or sooner if needed       Maite Mccoy MD  Family Medicine Physician   [facility]     This note was created using [employer] voice recognition software that occasionally may misinterpret phrases or words.

## 2024-01-25 ENCOUNTER — CLINICAL SUPPORT (OUTPATIENT)
Dept: FAMILY MEDICINE | Facility: CLINIC | Age: 59
End: 2024-01-25
Payer: COMMERCIAL

## 2024-01-25 DIAGNOSIS — I10 PRIMARY HYPERTENSION: ICD-10-CM

## 2024-01-25 DIAGNOSIS — Z11.59 ENCOUNTER FOR HEPATITIS C SCREENING TEST FOR LOW RISK PATIENT: ICD-10-CM

## 2024-01-25 DIAGNOSIS — Z13.220 SCREENING FOR LIPID DISORDERS: ICD-10-CM

## 2024-01-25 DIAGNOSIS — Z13.1 SCREENING FOR DIABETES MELLITUS: ICD-10-CM

## 2024-01-25 LAB
ALBUMIN SERPL BCP-MCNC: 4 G/DL (ref 3.5–5.2)
ALP SERPL-CCNC: 59 U/L (ref 55–135)
ALT SERPL W/O P-5'-P-CCNC: 17 U/L (ref 10–44)
ANION GAP SERPL CALC-SCNC: 10 MMOL/L (ref 8–16)
AST SERPL-CCNC: 16 U/L (ref 10–40)
BASOPHILS # BLD AUTO: 0 K/UL (ref 0–0.2)
BASOPHILS NFR BLD: 0 % (ref 0–1.9)
BILIRUB SERPL-MCNC: 0.5 MG/DL (ref 0.1–1)
BUN SERPL-MCNC: 13 MG/DL (ref 6–20)
CALCIUM SERPL-MCNC: 9 MG/DL (ref 8.7–10.5)
CHLORIDE SERPL-SCNC: 106 MMOL/L (ref 95–110)
CHOLEST SERPL-MCNC: 213 MG/DL (ref 120–199)
CHOLEST/HDLC SERPL: 3 {RATIO} (ref 2–5)
CO2 SERPL-SCNC: 26 MMOL/L (ref 23–29)
CREAT SERPL-MCNC: 0.9 MG/DL (ref 0.5–1.4)
DIFFERENTIAL METHOD BLD: ABNORMAL
EOSINOPHIL # BLD AUTO: 0.1 K/UL (ref 0–0.5)
EOSINOPHIL NFR BLD: 2.1 % (ref 0–8)
ERYTHROCYTE [DISTWIDTH] IN BLOOD BY AUTOMATED COUNT: 13.1 % (ref 11.5–14.5)
EST. GFR  (NO RACE VARIABLE): >60 ML/MIN/1.73 M^2
ESTIMATED AVG GLUCOSE: 117 MG/DL (ref 68–131)
GLUCOSE SERPL-MCNC: 117 MG/DL (ref 70–110)
HBA1C MFR BLD: 5.7 % (ref 4–5.6)
HCT VFR BLD AUTO: 40 % (ref 37–48.5)
HDLC SERPL-MCNC: 71 MG/DL (ref 40–75)
HDLC SERPL: 33.3 % (ref 20–50)
HGB BLD-MCNC: 13.2 G/DL (ref 12–16)
IMM GRANULOCYTES # BLD AUTO: 0.02 K/UL (ref 0–0.04)
IMM GRANULOCYTES NFR BLD AUTO: 0.3 % (ref 0–0.5)
LDLC SERPL CALC-MCNC: 121.8 MG/DL (ref 63–159)
LYMPHOCYTES # BLD AUTO: 1.2 K/UL (ref 1–4.8)
LYMPHOCYTES NFR BLD: 17.7 % (ref 18–48)
MCH RBC QN AUTO: 28.9 PG (ref 27–31)
MCHC RBC AUTO-ENTMCNC: 33 G/DL (ref 32–36)
MCV RBC AUTO: 88 FL (ref 82–98)
MONOCYTES # BLD AUTO: 0.4 K/UL (ref 0.3–1)
MONOCYTES NFR BLD: 5.8 % (ref 4–15)
NEUTROPHILS # BLD AUTO: 4.9 K/UL (ref 1.8–7.7)
NEUTROPHILS NFR BLD: 74.1 % (ref 38–73)
NONHDLC SERPL-MCNC: 142 MG/DL
NRBC BLD-RTO: 0 /100 WBC
PLATELET # BLD AUTO: 184 K/UL (ref 150–450)
PMV BLD AUTO: 10.1 FL (ref 9.2–12.9)
POTASSIUM SERPL-SCNC: 4.1 MMOL/L (ref 3.5–5.1)
PROT SERPL-MCNC: 7.1 G/DL (ref 6–8.4)
RBC # BLD AUTO: 4.56 M/UL (ref 4–5.4)
SODIUM SERPL-SCNC: 142 MMOL/L (ref 136–145)
TRIGL SERPL-MCNC: 101 MG/DL (ref 30–150)
WBC # BLD AUTO: 6.67 K/UL (ref 3.9–12.7)

## 2024-01-25 PROCEDURE — 85025 COMPLETE CBC W/AUTO DIFF WBC: CPT | Performed by: FAMILY MEDICINE

## 2024-01-25 PROCEDURE — 80053 COMPREHEN METABOLIC PANEL: CPT | Performed by: FAMILY MEDICINE

## 2024-01-25 PROCEDURE — 80061 LIPID PANEL: CPT | Performed by: FAMILY MEDICINE

## 2024-01-25 PROCEDURE — 83036 HEMOGLOBIN GLYCOSYLATED A1C: CPT | Performed by: FAMILY MEDICINE

## 2024-01-25 PROCEDURE — 86803 HEPATITIS C AB TEST: CPT | Performed by: FAMILY MEDICINE

## 2024-01-26 LAB — HCV AB SERPL QL IA: NORMAL

## 2024-02-14 RX ORDER — SPIRONOLACTONE 25 MG/1
25 TABLET ORAL
Qty: 30 TABLET | Refills: 5 | Status: SHIPPED | OUTPATIENT
Start: 2024-02-14

## 2024-03-08 DIAGNOSIS — M25.561 CHRONIC PAIN OF RIGHT KNEE: Primary | ICD-10-CM

## 2024-03-08 DIAGNOSIS — G89.29 CHRONIC PAIN OF RIGHT KNEE: Primary | ICD-10-CM

## 2024-03-14 ENCOUNTER — HOSPITAL ENCOUNTER (OUTPATIENT)
Dept: RADIOLOGY | Facility: HOSPITAL | Age: 59
Discharge: HOME | End: 2024-03-14
Attending: ORTHOPAEDIC SURGERY
Payer: COMMERCIAL

## 2024-03-14 ENCOUNTER — OFFICE VISIT (OUTPATIENT)
Dept: ORTHOPEDICS | Facility: CLINIC | Age: 59
End: 2024-03-14
Payer: COMMERCIAL

## 2024-03-14 VITALS
WEIGHT: 185.19 LBS | RESPIRATION RATE: 18 BRPM | OXYGEN SATURATION: 96 % | BODY MASS INDEX: 37.33 KG/M2 | HEART RATE: 85 BPM | HEIGHT: 59 IN

## 2024-03-14 DIAGNOSIS — M25.561 CHRONIC PAIN OF RIGHT KNEE: ICD-10-CM

## 2024-03-14 DIAGNOSIS — M17.0 PRIMARY OSTEOARTHRITIS OF BOTH KNEES: Primary | ICD-10-CM

## 2024-03-14 DIAGNOSIS — G89.29 CHRONIC PAIN OF RIGHT KNEE: ICD-10-CM

## 2024-03-14 PROCEDURE — 3008F BODY MASS INDEX DOCD: CPT | Mod: CPTII,S$GLB,, | Performed by: ORTHOPAEDIC SURGERY

## 2024-03-14 PROCEDURE — 1160F RVW MEDS BY RX/DR IN RCRD: CPT | Mod: CPTII,S$GLB,, | Performed by: ORTHOPAEDIC SURGERY

## 2024-03-14 PROCEDURE — 73562 X-RAY EXAM OF KNEE 3: CPT | Mod: TC,PN,LT

## 2024-03-14 PROCEDURE — 99214 OFFICE O/P EST MOD 30 MIN: CPT | Mod: 25,S$GLB,, | Performed by: ORTHOPAEDIC SURGERY

## 2024-03-14 PROCEDURE — 1159F MED LIST DOCD IN RCRD: CPT | Mod: CPTII,S$GLB,, | Performed by: ORTHOPAEDIC SURGERY

## 2024-03-14 PROCEDURE — 99999 PR PBB SHADOW E&M-EST. PATIENT-LVL III: CPT | Mod: PBBFAC,,, | Performed by: ORTHOPAEDIC SURGERY

## 2024-03-14 PROCEDURE — 73562 X-RAY EXAM OF KNEE 3: CPT | Mod: 26,LT,, | Performed by: RADIOLOGY

## 2024-03-14 PROCEDURE — 20610 DRAIN/INJ JOINT/BURSA W/O US: CPT | Mod: LT,S$GLB,, | Performed by: ORTHOPAEDIC SURGERY

## 2024-03-14 PROCEDURE — 3044F HG A1C LEVEL LT 7.0%: CPT | Mod: CPTII,S$GLB,, | Performed by: ORTHOPAEDIC SURGERY

## 2024-03-14 RX ORDER — CELECOXIB 100 MG/1
100 CAPSULE ORAL 2 TIMES DAILY
Qty: 60 CAPSULE | Refills: 1 | Status: SHIPPED | OUTPATIENT
Start: 2024-03-14

## 2024-03-14 RX ORDER — TRIAMCINOLONE ACETONIDE 40 MG/ML
40 INJECTION, SUSPENSION INTRA-ARTICULAR; INTRAMUSCULAR
Status: DISCONTINUED | OUTPATIENT
Start: 2024-03-14 | End: 2024-03-14 | Stop reason: HOSPADM

## 2024-03-14 RX ADMIN — TRIAMCINOLONE ACETONIDE 40 MG: 40 INJECTION, SUSPENSION INTRA-ARTICULAR; INTRAMUSCULAR at 09:03

## 2024-03-14 NOTE — LETTER
March 14, 2024      [facility]  [address]  [place] [address]  Phone: [phone]  Fax: [phone]       Patient: Kaylie Madden   YOB: 1965  Date of Visit: 03/14/2024    To Whom It May Concern:    Hermann Madden  was at [facility] on 03/14/2024. The patient may return to work on 03/14/24 with no restrictions. If you have any questions or concerns, or if I can be of further assistance, please do not hesitate to contact me.    Sincerely,    Tram Nieves LPN

## 2024-03-14 NOTE — PROGRESS NOTES
Subjective:      Patient ID: Kaylie Madden is a 59 y.o. female.    Chief Complaint: Pain and Injections of the Left Knee    HPI  Patient follow up on bilateral knee pain.  Knee was improved a bit with the previous injection.  She is quite active in on her feet frequently throughout the day.  Meloxicam she states cause some stomach upset she seems to be feeling better just by taking over-the-counter ibuprofen.  ROS      Objective:    Ortho Exam     Constitutional:   Patient is alert  and oriented in no acute distress  HEENT:  normocephalic atraumatic; PERRL EOMI  Neck:  Supple without adenopathy  Cardiovascular:  Normal rate and rhythm  Pulmonary:  Normal respiratory effort normal chest wall expansion  Abdominal:  Nonprotuberant nondistended  Musculoskeletal:  Patient continues to have diffuse joint line tenderness  She has a minimally antalgic gait.  Neurological:  No focal defect; cranial nerves 2-12 grossly intact  Psychiatric/behavioral:  Mood and behavior normal      X-Ray Knee 3 View Left  EXAMINATION:  XR KNEE 3 VIEW LEFT    CLINICAL HISTORY:  Pain in right knee    TECHNIQUE:  AP, lateral, and Merchant views of the left knee were performed.    COMPARISON:  12/28/2023.    FINDINGS:  Mild-to-moderate tricompartmental degenerative osteoarthrosis most predominant within the patellofemoral articulation.  No significant suprapatellar effusion.    Electronically signed by: Edward Yañez  Date:    03/14/2024  Time:    08:54       My Radiographs Findings:    I have personally reviewed radiographs and concur with above findings    Assessment:       Encounter Diagnosis   Name Primary?    Primary osteoarthritis of both knees Yes         Plan:       I have discussed medical condition treatment options with her at length.  After a verbal consent and sterile prep I injected her left knee today without complication with a combination of cc of Kenalog several cc of lidocaine.  If symptoms fail to improve may need to consider  MRI.  I will see her back 6 weeks sooner if any questions or problems        History reviewed. No pertinent past medical history.  Past Surgical History:   Procedure Laterality Date     SECTION      FOOT SURGERY           Current Outpatient Medications:     amLODIPine (NORVASC) 10 MG tablet, Take 1 tablet (10 mg total) by mouth once daily., Disp: 30 tablet, Rfl: 1    lisinopriL-hydrochlorothiazide (PRINZIDE,ZESTORETIC) 20-12.5 mg per tablet, Take 2 tablets by mouth once daily., Disp: 180 tablet, Rfl: 1    celecoxib (CELEBREX) 100 MG capsule, Take 1 capsule (100 mg total) by mouth 2 (two) times daily., Disp: 60 capsule, Rfl: 1    spironolactone (ALDACTONE) 25 MG tablet, TAKE 1 TABLET BY MOUTH ONCE DAILY (Patient not taking: Reported on 3/14/2024), Disp: 30 tablet, Rfl: 5    Review of patient's allergies indicates:   Allergen Reactions    Metaxalone Nausea And Vomiting    Cephalexin Other (See Comments)    Corticosteroids (glucocorticoids) Other (See Comments)     rage       Family History   Problem Relation Age of Onset    Diabetes Mother     Hypertension Mother      Social History     Occupational History    Not on file   Tobacco Use    Smoking status: Never     Passive exposure: Never    Smokeless tobacco: Never   Substance and Sexual Activity    Alcohol use: No    Drug use: No    Sexual activity: Yes     Partners: Male     Birth control/protection: None

## 2024-03-14 NOTE — PROCEDURES
Large Joint Aspiration/Injection: L knee    Date/Time: 3/14/2024 9:00 AM    Performed by: Andrey Negron MD  Authorized by: Andrey Negron MD    Consent Done?:  Yes (Verbal)  Indications:  Pain  Site marked: the procedure site was marked    Timeout: prior to procedure the correct patient, procedure, and site was verified    Local anesthetic:  Lidocaine 1% without epinephrine and bupivacaine 0.25% without epinephrine  Anesthetic total (ml):  6      Details:  Needle Size:  20 G  Approach:  Anterolateral  Location:  Knee  Site:  L knee  Medications:  40 mg triamcinolone acetonide 40 mg/mL  Patient tolerance:  Patient tolerated the procedure well with no immediate complications

## 2024-03-19 ENCOUNTER — PATIENT MESSAGE (OUTPATIENT)
Dept: ADMINISTRATIVE | Facility: HOSPITAL | Age: 59
End: 2024-03-19
Payer: COMMERCIAL

## 2024-04-03 ENCOUNTER — PATIENT MESSAGE (OUTPATIENT)
Dept: ADMINISTRATIVE | Facility: HOSPITAL | Age: 59
End: 2024-04-03
Payer: COMMERCIAL

## 2024-05-03 ENCOUNTER — PATIENT MESSAGE (OUTPATIENT)
Dept: ADMINISTRATIVE | Facility: HOSPITAL | Age: 59
End: 2024-05-03
Payer: COMMERCIAL

## 2024-05-23 ENCOUNTER — OFFICE VISIT (OUTPATIENT)
Dept: ORTHOPEDICS | Facility: CLINIC | Age: 59
End: 2024-05-23
Payer: COMMERCIAL

## 2024-05-23 VITALS — HEIGHT: 59 IN | BODY MASS INDEX: 37.33 KG/M2 | OXYGEN SATURATION: 97 % | HEART RATE: 82 BPM | WEIGHT: 185.19 LBS

## 2024-05-23 DIAGNOSIS — M17.0 PRIMARY OSTEOARTHRITIS OF BOTH KNEES: Primary | ICD-10-CM

## 2024-05-23 PROCEDURE — 3008F BODY MASS INDEX DOCD: CPT | Mod: CPTII,S$GLB,, | Performed by: ORTHOPAEDIC SURGERY

## 2024-05-23 PROCEDURE — 1159F MED LIST DOCD IN RCRD: CPT | Mod: CPTII,S$GLB,, | Performed by: ORTHOPAEDIC SURGERY

## 2024-05-23 PROCEDURE — 99214 OFFICE O/P EST MOD 30 MIN: CPT | Mod: S$GLB,,, | Performed by: ORTHOPAEDIC SURGERY

## 2024-05-23 PROCEDURE — 3044F HG A1C LEVEL LT 7.0%: CPT | Mod: CPTII,S$GLB,, | Performed by: ORTHOPAEDIC SURGERY

## 2024-05-23 PROCEDURE — 99999 PR PBB SHADOW E&M-EST. PATIENT-LVL III: CPT | Mod: PBBFAC,,, | Performed by: ORTHOPAEDIC SURGERY

## 2024-05-23 PROCEDURE — 1160F RVW MEDS BY RX/DR IN RCRD: CPT | Mod: CPTII,S$GLB,, | Performed by: ORTHOPAEDIC SURGERY

## 2024-05-23 RX ORDER — PREDNISONE 10 MG/1
10 TABLET ORAL 2 TIMES DAILY
COMMUNITY
Start: 2024-05-06

## 2024-05-23 RX ORDER — GABAPENTIN 300 MG/1
300 CAPSULE ORAL
COMMUNITY
Start: 2024-04-11

## 2024-05-23 RX ORDER — GABAPENTIN 600 MG/1
600 TABLET ORAL
COMMUNITY
Start: 2024-04-25

## 2024-05-23 RX ORDER — TRAMADOL HYDROCHLORIDE 50 MG/1
50 TABLET ORAL 2 TIMES DAILY PRN
COMMUNITY
Start: 2024-05-17

## 2024-05-23 RX ORDER — HYDROCODONE BITARTRATE AND ACETAMINOPHEN 10; 325 MG/1; MG/1
1 TABLET ORAL 4 TIMES DAILY PRN
COMMUNITY
Start: 2024-05-22

## 2024-05-23 NOTE — PROGRESS NOTES
Subjective:      Patient ID: Kaylie Madden is a 59 y.o. female.    Chief Complaint: Pain of the Right Knee and Pain of the Left Knee    HPI  Patient follow up on bilateral knee pain after having had corticosteroids in both.  Got adequate short-term relief on the right side not so much on the left.  She is complaining of throbbing pain intermittent aching and swelling with prolonged standing and walking.  ROS      Objective:    Ortho Exam       Constitutional:   Patient is alert  and oriented in no acute distress  HEENT:  normocephalic atraumatic; PERRL EOMI  Neck:  Supple without adenopathy  Cardiovascular:  Normal rate and rhythm  Pulmonary:  Normal respiratory effort normal chest wall expansion  Abdominal:  Nonprotuberant nondistended  Musculoskeletal:  Mildly antalgic gait diffuse crepitus and diffuse joint line tenderness of both knee  Neurological:  No focal defect; cranial nerves 2-12 grossly intact  Psychiatric/behavioral:  Mood and behavior normal    X-Ray Knee 3 View Left  EXAMINATION:  XR KNEE 3 VIEW LEFT    CLINICAL HISTORY:  Pain in right knee    TECHNIQUE:  AP, lateral, and Merchant views of the left knee were performed.    COMPARISON:  12/28/2023.    FINDINGS:  Mild-to-moderate tricompartmental degenerative osteoarthrosis most predominant within the patellofemoral articulation.  No significant suprapatellar effusion.    Electronically signed by: Edward Yañez  Date:    03/14/2024  Time:    08:54       My Radiographs Findings:    No new radiographs  Assessment:       Encounter Diagnosis   Name Primary?    Primary osteoarthritis of both knees Yes         Plan:       I have discussed medical condition treatment options with her at length.  I have offered an MRI of the left knee to assess possibility of meniscal tears that was declined.  Most of her symptoms probably related to just a degenerative nature of her knee and I think she would be reasonable candidate for viscosupplementation.  We will try to  get bilateral Synvisc approved for her.  Follow up afterwards I will see her sooner if any questions or problems        History reviewed. No pertinent past medical history.  Past Surgical History:   Procedure Laterality Date     SECTION      FOOT SURGERY           Current Outpatient Medications:     amLODIPine (NORVASC) 10 MG tablet, Take 1 tablet (10 mg total) by mouth once daily., Disp: 30 tablet, Rfl: 1    celecoxib (CELEBREX) 100 MG capsule, Take 1 capsule (100 mg total) by mouth 2 (two) times daily., Disp: 60 capsule, Rfl: 1    gabapentin (NEURONTIN) 300 MG capsule, Take 300 mg by mouth., Disp: , Rfl:     gabapentin (NEURONTIN) 600 MG tablet, Take 600 mg by mouth., Disp: , Rfl:     HYDROcodone-acetaminophen (NORCO)  mg per tablet, Take 1 tablet by mouth 4 (four) times daily as needed., Disp: , Rfl:     lisinopriL-hydrochlorothiazide (PRINZIDE,ZESTORETIC) 20-12.5 mg per tablet, Take 2 tablets by mouth once daily., Disp: 180 tablet, Rfl: 1    predniSONE (DELTASONE) 10 MG tablet, Take 10 mg by mouth 2 (two) times daily., Disp: , Rfl:     spironolactone (ALDACTONE) 25 MG tablet, TAKE 1 TABLET BY MOUTH ONCE DAILY, Disp: 30 tablet, Rfl: 5    traMADoL (ULTRAM) 50 mg tablet, Take 50 mg by mouth 2 (two) times daily as needed., Disp: , Rfl:     Review of patient's allergies indicates:   Allergen Reactions    Metaxalone Nausea And Vomiting    Cephalexin Other (See Comments)    Corticosteroids (glucocorticoids) Other (See Comments)     rage       Family History   Problem Relation Name Age of Onset    Diabetes Mother      Hypertension Mother       Social History     Occupational History    Not on file   Tobacco Use    Smoking status: Never     Passive exposure: Never    Smokeless tobacco: Never   Substance and Sexual Activity    Alcohol use: No    Drug use: No    Sexual activity: Yes     Partners: Male     Birth control/protection: None

## 2024-05-24 DIAGNOSIS — M17.0 PRIMARY OSTEOARTHRITIS OF BOTH KNEES: Primary | ICD-10-CM

## 2024-06-03 ENCOUNTER — OFFICE VISIT (OUTPATIENT)
Dept: ORTHOPEDICS | Facility: CLINIC | Age: 59
End: 2024-06-03
Payer: COMMERCIAL

## 2024-06-03 ENCOUNTER — TELEPHONE (OUTPATIENT)
Dept: ORTHOPEDICS | Facility: CLINIC | Age: 59
End: 2024-06-03
Payer: COMMERCIAL

## 2024-06-03 VITALS — BODY MASS INDEX: 37.33 KG/M2 | HEART RATE: 87 BPM | HEIGHT: 59 IN | WEIGHT: 185.19 LBS | OXYGEN SATURATION: 100 %

## 2024-06-03 DIAGNOSIS — M17.0 PRIMARY OSTEOARTHRITIS OF BOTH KNEES: Primary | ICD-10-CM

## 2024-06-03 PROCEDURE — 1160F RVW MEDS BY RX/DR IN RCRD: CPT | Mod: CPTII,S$GLB,, | Performed by: ORTHOPAEDIC SURGERY

## 2024-06-03 PROCEDURE — 99999 PR PBB SHADOW E&M-EST. PATIENT-LVL III: CPT | Mod: PBBFAC,,, | Performed by: ORTHOPAEDIC SURGERY

## 2024-06-03 PROCEDURE — 3008F BODY MASS INDEX DOCD: CPT | Mod: CPTII,S$GLB,, | Performed by: ORTHOPAEDIC SURGERY

## 2024-06-03 PROCEDURE — 20610 DRAIN/INJ JOINT/BURSA W/O US: CPT | Mod: 50,S$GLB,, | Performed by: ORTHOPAEDIC SURGERY

## 2024-06-03 PROCEDURE — 1159F MED LIST DOCD IN RCRD: CPT | Mod: CPTII,S$GLB,, | Performed by: ORTHOPAEDIC SURGERY

## 2024-06-03 PROCEDURE — 99214 OFFICE O/P EST MOD 30 MIN: CPT | Mod: 25,S$GLB,, | Performed by: ORTHOPAEDIC SURGERY

## 2024-06-03 PROCEDURE — 3044F HG A1C LEVEL LT 7.0%: CPT | Mod: CPTII,S$GLB,, | Performed by: ORTHOPAEDIC SURGERY

## 2024-06-03 NOTE — PROCEDURES
Large Joint Aspiration/Injection: bilateral knee    Date/Time: 6/3/2024 10:00 AM    Performed by: Andrey Negron MD  Authorized by: Andrey Negron MD    Consent Done?:  Yes (Verbal)  Indications:  Pain  Site marked: the procedure site was marked    Timeout: prior to procedure the correct patient, procedure, and site was verified      Details:  Needle Size:  20 G  Approach:  Anterolateral  Location:  Knee  Laterality:  Bilateral  Site:  Bilateral knee  Medications (Right):  60 mg hyaluronate sodium, stabilized (DUROLANE) 60 mg/3 mL  Medications (Left):  60 mg hyaluronate sodium, stabilized (DUROLANE) 60 mg/3 mL  Patient tolerance:  Patient tolerated the procedure well with no immediate complications

## 2024-06-03 NOTE — TELEPHONE ENCOUNTER
----- Message from Alyson Yu sent at 6/3/2024  9:32 AM CDT -----  Type: Needs Medical Advice  Who Called:  pt  Best Call Back Number: [phone]  Additional Information: pt is calling in regards to needing to speak to the nurse to see if her injection was covered by her insurance. Needs to speak to the office ASAP as her appt is for 10 this morning. Please call back and advise. Thanks!

## 2024-06-03 NOTE — PROGRESS NOTES
Subjective:      Patient ID: Kaylie Madden is a 59 y.o. female.    Chief Complaint: Pain of the Right Knee and Pain and Swelling of the Left Knee    HPI\   Patient follow up on bilateral knee pain left greater than right.  It has been fairly acute over the last several weeks.  ROS      Objective:    Ortho Exam       Constitutional:   Patient is alert  and oriented in no acute distress  HEENT:  normocephalic atraumatic; PERRL EOMI  Neck:  Supple without adenopathy  Cardiovascular:  Normal rate and rhythm  Pulmonary:  Normal respiratory effort normal chest wall expansion  Abdominal:  Nonprotuberant nondistended  Musculoskeletal:  Patient has a mildly antalgic gait   She has adequate knee range of motion she has a good bit of patellofemoral crepitus diffuse joint line tenderness   No gross instability no increased warmth or erythema  Neurological:  No focal defect; cranial nerves 2-12 grossly intact  Psychiatric/behavioral:  Mood and behavior normal      X-Ray Knee 3 View Left  EXAMINATION:  XR KNEE 3 VIEW LEFT    CLINICAL HISTORY:  Pain in right knee    TECHNIQUE:  AP, lateral, and Merchant views of the left knee were performed.    COMPARISON:  12/28/2023.    FINDINGS:  Mild-to-moderate tricompartmental degenerative osteoarthrosis most predominant within the patellofemoral articulation.  No significant suprapatellar effusion.    Electronically signed by: Edward Yañez  Date:    03/14/2024  Time:    08:54       My Radiographs Findings:    I have personally reviewed radiographs and concur with above findings    Assessment:       Encounter Diagnosis   Name Primary?    Primary osteoarthritis of both knees Yes         Plan:        I have discussed medical condition and treatment options with her at length.  After a verbal consent sterile prep I injected both knees today with the prepackaged drooling.  I have discussed general knee rehab exercises ice elevation compressive wrapping and NSAIDs as needed.  Follow up in 6  weeks I will see her sooner if any questions or problems        History reviewed. No pertinent past medical history.  Past Surgical History:   Procedure Laterality Date     SECTION      FOOT SURGERY           Current Outpatient Medications:     amLODIPine (NORVASC) 10 MG tablet, Take 1 tablet (10 mg total) by mouth once daily., Disp: 30 tablet, Rfl: 1    celecoxib (CELEBREX) 100 MG capsule, Take 1 capsule (100 mg total) by mouth 2 (two) times daily., Disp: 60 capsule, Rfl: 1    gabapentin (NEURONTIN) 300 MG capsule, Take 300 mg by mouth., Disp: , Rfl:     gabapentin (NEURONTIN) 600 MG tablet, Take 600 mg by mouth., Disp: , Rfl:     lisinopriL-hydrochlorothiazide (PRINZIDE,ZESTORETIC) 20-12.5 mg per tablet, Take 2 tablets by mouth once daily., Disp: 180 tablet, Rfl: 1    predniSONE (DELTASONE) 10 MG tablet, Take 10 mg by mouth 2 (two) times daily., Disp: , Rfl:     spironolactone (ALDACTONE) 25 MG tablet, TAKE 1 TABLET BY MOUTH ONCE DAILY, Disp: 30 tablet, Rfl: 5    traMADoL (ULTRAM) 50 mg tablet, Take 50 mg by mouth 2 (two) times daily as needed., Disp: , Rfl:     HYDROcodone-acetaminophen (NORCO)  mg per tablet, Take 1 tablet by mouth 4 (four) times daily as needed. (Patient not taking: Reported on 6/3/2024), Disp: , Rfl:     Review of patient's allergies indicates:   Allergen Reactions    Metaxalone Nausea And Vomiting    Cephalexin Other (See Comments)    Corticosteroids (glucocorticoids) Other (See Comments)     rage       Family History   Problem Relation Name Age of Onset    Diabetes Mother      Hypertension Mother       Social History     Occupational History    Not on file   Tobacco Use    Smoking status: Never     Passive exposure: Never    Smokeless tobacco: Never   Substance and Sexual Activity    Alcohol use: No    Drug use: No    Sexual activity: Yes     Partners: Male     Birth control/protection: None

## 2024-06-05 ENCOUNTER — TELEPHONE (OUTPATIENT)
Dept: ORTHOPEDICS | Facility: CLINIC | Age: 59
End: 2024-06-05
Payer: COMMERCIAL

## 2024-06-05 NOTE — TELEPHONE ENCOUNTER
Pt stated her pain in the left leg is now going from hip to the toes.     Pt stated she is putting Ice packs and heat on the back of the knees and the heat agitates it and the ice works better but is in more pain than before after.       Messaged Dr. Negron via secure chat awaiting response.

## 2024-06-05 NOTE — TELEPHONE ENCOUNTER
----- Message from Caro Pastrana sent at 6/5/2024  1:54 PM CDT -----  Regarding: advise  Contact: patient  Type: Needs Medical Advice  Who Called:  patient   Symptoms (please be specific):   How long has patient had these symptoms:    Pharmacy name and phone #:    Best Call Back Number: [phone]    Additional Information: gel injections Monday pt states she is in more pain than before call to advise .

## 2024-06-05 NOTE — TELEPHONE ENCOUNTER
"Dr. Negron advised: "I can't explain her hip to toe pain. It's not from the knees. I explained the gel injections didn't work as quickly and they could Cause a temporary increase in pain. Ice and elevation and compression and her NSAIDs is all I can recommend ."    Explained to pt. Pt verbalized understanding.   "

## 2024-08-15 ENCOUNTER — OFFICE VISIT (OUTPATIENT)
Dept: ORTHOPEDICS | Facility: CLINIC | Age: 59
End: 2024-08-15
Payer: COMMERCIAL

## 2024-08-15 VITALS — WEIGHT: 179.38 LBS | HEART RATE: 87 BPM | BODY MASS INDEX: 36.16 KG/M2 | OXYGEN SATURATION: 98 % | HEIGHT: 59 IN

## 2024-08-15 DIAGNOSIS — M17.0 PRIMARY OSTEOARTHRITIS OF BOTH KNEES: Primary | ICD-10-CM

## 2024-08-15 PROCEDURE — 99999 PR PBB SHADOW E&M-EST. PATIENT-LVL III: CPT | Mod: PBBFAC,,, | Performed by: ORTHOPAEDIC SURGERY

## 2024-08-15 RX ORDER — TRIAMCINOLONE ACETONIDE 40 MG/ML
40 INJECTION, SUSPENSION INTRA-ARTICULAR; INTRAMUSCULAR
Status: DISCONTINUED | OUTPATIENT
Start: 2024-08-15 | End: 2024-08-15 | Stop reason: HOSPADM

## 2024-08-15 RX ADMIN — TRIAMCINOLONE ACETONIDE 40 MG: 40 INJECTION, SUSPENSION INTRA-ARTICULAR; INTRAMUSCULAR at 08:08

## 2024-08-15 NOTE — PROCEDURES
Large Joint Aspiration/Injection: L knee    Date/Time: 8/15/2024 8:45 AM    Performed by: Andrey Negron MD  Authorized by: Andrey Negron MD    Consent Done?:  Yes (Verbal)  Indications:  Pain  Site marked: the procedure site was marked    Timeout: prior to procedure the correct patient, procedure, and site was verified    Local anesthetic:  Lidocaine 1% without epinephrine and bupivacaine 0.25% without epinephrine  Anesthetic total (ml):  6      Details:  Needle Size:  20 G  Approach:  Anterolateral  Location:  Knee  Site:  L knee  Medications:  40 mg triamcinolone acetonide 40 mg/mL  Patient tolerance:  Patient tolerated the procedure well with no immediate complications

## 2024-08-15 NOTE — PROGRESS NOTES
Subjective:      Patient ID: Kaylie Madden is a 59 y.o. female.    Chief Complaint: Pain of the Left Knee and Pain of the Right Knee    HPI  Patient follow up on bilateral left greater than right knee pain.  States she may have had unrealistic expectations but certainly did not get complete pain relief with the duralane injections left knee has been quite symptomatic over the last several months.  ROS      Objective:    Ortho Exam     Constitutional:   Patient is alert  and oriented in no acute distress  HEENT:  normocephalic atraumatic; PERRL EOMI  Neck:  Supple without adenopathy  Cardiovascular:  Normal rate and rhythm  Pulmonary:  Normal respiratory effort normal chest wall expansion  Abdominal:  Nonprotuberant nondistended  Musculoskeletal:  Patient has a steady very minimally antalgic gait  She has crepitus with range of motion diffuse joint line tenderness  Maybe a trace effusion no increased warmth or erythema no gross instability  Neurological:  No focal defect; cranial nerves 2-12 grossly intact  Psychiatric/behavioral:  Mood and behavior normal      X-Ray Knee 3 View Left  EXAMINATION:  XR KNEE 3 VIEW LEFT    CLINICAL HISTORY:  Pain in right knee    TECHNIQUE:  AP, lateral, and Merchant views of the left knee were performed.    COMPARISON:  12/28/2023.    FINDINGS:  Mild-to-moderate tricompartmental degenerative osteoarthrosis most predominant within the patellofemoral articulation.  No significant suprapatellar effusion.    Electronically signed by: Edward Yañez  Date:    03/14/2024  Time:    08:54       My Radiographs Findings:    I have personally reviewed radiographs and concur with above findings    Assessment:       Encounter Diagnosis   Name Primary?    Primary osteoarthritis of both knees Yes         Plan:       I have discussed medical condition treatment options with her at length.  The try to get her some symptomatic relief I have injected her left knee today with a corticosteroids.  If  symptoms fail to improve may need to consider total knee replacement.  Follow up can be as needed.        History reviewed. No pertinent past medical history.  Past Surgical History:   Procedure Laterality Date     SECTION      FOOT SURGERY           Current Outpatient Medications:     amLODIPine (NORVASC) 10 MG tablet, Take 1 tablet (10 mg total) by mouth once daily., Disp: 30 tablet, Rfl: 1    celecoxib (CELEBREX) 100 MG capsule, Take 1 capsule (100 mg total) by mouth 2 (two) times daily., Disp: 60 capsule, Rfl: 1    gabapentin (NEURONTIN) 300 MG capsule, Take 300 mg by mouth., Disp: , Rfl:     gabapentin (NEURONTIN) 600 MG tablet, Take 600 mg by mouth., Disp: , Rfl:     HYDROcodone-acetaminophen (NORCO)  mg per tablet, Take 1 tablet by mouth 4 (four) times daily as needed., Disp: , Rfl:     lisinopriL-hydrochlorothiazide (PRINZIDE,ZESTORETIC) 20-12.5 mg per tablet, Take 2 tablets by mouth once daily., Disp: 180 tablet, Rfl: 1    predniSONE (DELTASONE) 10 MG tablet, Take 10 mg by mouth 2 (two) times daily., Disp: , Rfl:     spironolactone (ALDACTONE) 25 MG tablet, TAKE 1 TABLET BY MOUTH ONCE DAILY, Disp: 30 tablet, Rfl: 5    traMADoL (ULTRAM) 50 mg tablet, Take 50 mg by mouth 2 (two) times daily as needed., Disp: , Rfl:     Review of patient's allergies indicates:   Allergen Reactions    Metaxalone Nausea And Vomiting    Cephalexin Other (See Comments)    Corticosteroids (glucocorticoids) Other (See Comments)     rage       Family History   Problem Relation Name Age of Onset    Diabetes Mother      Hypertension Mother       Social History     Occupational History    Not on file   Tobacco Use    Smoking status: Never     Passive exposure: Never    Smokeless tobacco: Never   Substance and Sexual Activity    Alcohol use: No    Drug use: No    Sexual activity: Yes     Partners: Male     Birth control/protection: None

## 2024-09-05 ENCOUNTER — OFFICE VISIT (OUTPATIENT)
Dept: ORTHOPEDICS | Facility: CLINIC | Age: 59
End: 2024-09-05
Payer: COMMERCIAL

## 2024-09-05 VITALS — BODY MASS INDEX: 36.44 KG/M2 | HEIGHT: 59 IN | WEIGHT: 180.75 LBS

## 2024-09-05 DIAGNOSIS — M17.0 PRIMARY OSTEOARTHRITIS OF BOTH KNEES: Primary | ICD-10-CM

## 2024-09-05 PROCEDURE — 99999 PR PBB SHADOW E&M-EST. PATIENT-LVL III: CPT | Mod: PBBFAC,,, | Performed by: ORTHOPAEDIC SURGERY

## 2024-09-05 RX ORDER — TRIAMCINOLONE ACETONIDE 40 MG/ML
40 INJECTION, SUSPENSION INTRA-ARTICULAR; INTRAMUSCULAR
Status: DISCONTINUED | OUTPATIENT
Start: 2024-09-05 | End: 2024-09-05 | Stop reason: HOSPADM

## 2024-09-05 RX ADMIN — TRIAMCINOLONE ACETONIDE 40 MG: 40 INJECTION, SUSPENSION INTRA-ARTICULAR; INTRAMUSCULAR at 09:09

## 2024-09-05 NOTE — PROGRESS NOTES
Subjective:      Patient ID: Kaylie Madden is a 59 y.o. female.    Chief Complaint: Pain of the Left Knee and Pain and Injections of the Right Knee    HPI  Patient follow up on bilateral knee pain.  She has adequate improvement from her left knee injection.  She is complaining of pain in the right knee.  She feels like she is having progressive pain in both knees recently.  That has quite severe at times limiting activities of daily living.  ROS      Objective:    Ortho Exam        Constitutional:   Patient is alert  and oriented in no acute distress  HEENT:  normocephalic atraumatic; PERRL EOMI  Neck:  Supple without adenopathy  Cardiovascular:  Normal rate and rhythm  Pulmonary:  Normal respiratory effort normal chest wall expansion  Abdominal:  Nonprotuberant nondistended  Musculoskeletal:  Patient has a steady very minimally antalgic gait  She has crepitus with range of motion diffuse joint line tenderness  Maybe a trace effusion no increased warmth or erythema no gross instability  Neurological:  No focal defect; cranial nerves 2-12 grossly intact  Psychiatric/behavioral:  Mood and behavior normal        X-Ray Knee 3 View Left  EXAMINATION:  XR KNEE 3 VIEW LEFT    CLINICAL HISTORY:  Pain in right knee    TECHNIQUE:  AP, lateral, and Merchant views of the left knee were performed.    COMPARISON:  12/28/2023.    FINDINGS:  Mild-to-moderate tricompartmental degenerative osteoarthrosis most predominant within the patellofemoral articulation.  No significant suprapatellar effusion.    Electronically signed by: Edward Yañez  Date:    03/14/2024  Time:    08:54       My Radiographs Findings:    No new radiographs  Assessment:       Encounter Diagnosis   Name Primary?    Primary osteoarthritis of both knees Yes         Plan:       I have discussed medical condition and treatment options with her at length.  After a verbal consent and sterile prep I injected her right knee today without complication.  If symptoms  fail to improve we may need to consider knee replacements in the future.  Follow up can be as needed.        History reviewed. No pertinent past medical history.  Past Surgical History:   Procedure Laterality Date     SECTION      FOOT SURGERY           Current Outpatient Medications:     gabapentin (NEURONTIN) 300 MG capsule, Take 300 mg by mouth., Disp: , Rfl:     gabapentin (NEURONTIN) 600 MG tablet, Take 600 mg by mouth., Disp: , Rfl:     lisinopriL-hydrochlorothiazide (PRINZIDE,ZESTORETIC) 20-12.5 mg per tablet, Take 2 tablets by mouth once daily., Disp: 180 tablet, Rfl: 1    amLODIPine (NORVASC) 10 MG tablet, Take 1 tablet (10 mg total) by mouth once daily. (Patient not taking: Reported on 2024), Disp: 30 tablet, Rfl: 1    celecoxib (CELEBREX) 100 MG capsule, Take 1 capsule (100 mg total) by mouth 2 (two) times daily. (Patient not taking: Reported on 2024), Disp: 60 capsule, Rfl: 1    HYDROcodone-acetaminophen (NORCO)  mg per tablet, Take 1 tablet by mouth 4 (four) times daily as needed. (Patient not taking: Reported on 2024), Disp: , Rfl:     predniSONE (DELTASONE) 10 MG tablet, Take 10 mg by mouth 2 (two) times daily. (Patient not taking: Reported on 2024), Disp: , Rfl:     spironolactone (ALDACTONE) 25 MG tablet, TAKE 1 TABLET BY MOUTH ONCE DAILY (Patient not taking: Reported on 2024), Disp: 30 tablet, Rfl: 5    traMADoL (ULTRAM) 50 mg tablet, Take 50 mg by mouth 2 (two) times daily as needed. (Patient not taking: Reported on 2024), Disp: , Rfl:     Review of patient's allergies indicates:   Allergen Reactions    Metaxalone Nausea And Vomiting    Cephalexin Other (See Comments)    Corticosteroids (glucocorticoids) Other (See Comments)     rage       Family History   Problem Relation Name Age of Onset    Diabetes Mother      Hypertension Mother       Social History     Occupational History    Not on file   Tobacco Use    Smoking status: Never     Passive exposure: Never     Smokeless tobacco: Never   Substance and Sexual Activity    Alcohol use: No    Drug use: No    Sexual activity: Yes     Partners: Male     Birth control/protection: None

## 2024-09-05 NOTE — PROCEDURES
Large Joint Aspiration/Injection: R knee    Date/Time: 9/5/2024 9:30 AM    Performed by: Andrey Negron MD  Authorized by: Andrey Negron MD    Consent Done?:  Yes (Verbal)  Indications:  Pain  Site marked: the procedure site was marked    Timeout: prior to procedure the correct patient, procedure, and site was verified    Local anesthetic: Ropivicaine.  Anesthetic total (ml):  3      Details:  Needle Size:  20 G  Approach:  Anterolateral  Location:  Knee  Site:  R knee  Medications:  40 mg triamcinolone acetonide 40 mg/mL  Patient tolerance:  Patient tolerated the procedure well with no immediate complications

## 2024-09-09 ENCOUNTER — TELEPHONE (OUTPATIENT)
Dept: FAMILY MEDICINE | Facility: CLINIC | Age: 59
End: 2024-09-09
Payer: COMMERCIAL

## 2024-09-09 NOTE — TELEPHONE ENCOUNTER
----- Message from Laya Feng sent at 9/9/2024  2:28 PM CDT -----  Pt needs a sooner appt  I could not find anything open  Please call to advise  [phone]  Thank you

## 2024-10-25 ENCOUNTER — TELEPHONE (OUTPATIENT)
Dept: FAMILY MEDICINE | Facility: CLINIC | Age: 59
End: 2024-10-25
Payer: COMMERCIAL

## 2024-10-25 NOTE — TELEPHONE ENCOUNTER
Spoke with Pt and scheduled appt for Monday 10/28/24 @1pm. I informed Pt that msg came up stating  she would have to pay out of pocket.

## 2024-10-25 NOTE — TELEPHONE ENCOUNTER
----- Message from Lion sent at 10/25/2024  8:49 AM CDT -----  Regarding: Sooner Appt  Type:  Sooner Appointment Request    Caller is requesting a sooner appointment.  Caller declined first available appointment listed below.  Caller will not accept being placed on the waitlist and is requesting a message be sent to doctor.    Name of Caller:Pt    When is the first available appointment?11/7    Symptoms:pains on left side under breast feels like a zapping pain    Would the patient rather a call back or a response via [employer]? Call back    Best Call Back Number:[phone]      Additional Information: Sts she needs to be seen sooner but bookit won't allow me to book due to Dr Mccoy not listed at pcp and she's an est pt.   Please advise -- Thank you

## 2024-11-07 ENCOUNTER — OFFICE VISIT (OUTPATIENT)
Dept: FAMILY MEDICINE | Facility: CLINIC | Age: 59
End: 2024-11-07
Payer: COMMERCIAL

## 2024-11-07 VITALS
HEART RATE: 75 BPM | DIASTOLIC BLOOD PRESSURE: 80 MMHG | SYSTOLIC BLOOD PRESSURE: 132 MMHG | OXYGEN SATURATION: 97 % | BODY MASS INDEX: 39.85 KG/M2 | RESPIRATION RATE: 16 BRPM | WEIGHT: 197.69 LBS | HEIGHT: 59 IN

## 2024-11-07 DIAGNOSIS — R07.89 ATYPICAL CHEST PAIN: ICD-10-CM

## 2024-11-07 DIAGNOSIS — Z12.11 SCREENING FOR COLON CANCER: ICD-10-CM

## 2024-11-07 DIAGNOSIS — G89.4 CHRONIC PAIN SYNDROME: ICD-10-CM

## 2024-11-07 DIAGNOSIS — Z78.9 NONSMOKER: ICD-10-CM

## 2024-11-07 DIAGNOSIS — Z12.31 SCREENING MAMMOGRAM FOR BREAST CANCER: ICD-10-CM

## 2024-11-07 DIAGNOSIS — I10 PRIMARY HYPERTENSION: Primary | ICD-10-CM

## 2024-11-07 DIAGNOSIS — R73.03 PREDIABETES: ICD-10-CM

## 2024-11-07 PROCEDURE — 1159F MED LIST DOCD IN RCRD: CPT | Mod: CPTII,S$GLB,, | Performed by: FAMILY MEDICINE

## 2024-11-07 PROCEDURE — 3075F SYST BP GE 130 - 139MM HG: CPT | Mod: CPTII,S$GLB,, | Performed by: FAMILY MEDICINE

## 2024-11-07 PROCEDURE — 3079F DIAST BP 80-89 MM HG: CPT | Mod: CPTII,S$GLB,, | Performed by: FAMILY MEDICINE

## 2024-11-07 PROCEDURE — G2211 COMPLEX E/M VISIT ADD ON: HCPCS | Mod: S$GLB,,, | Performed by: FAMILY MEDICINE

## 2024-11-07 PROCEDURE — 4010F ACE/ARB THERAPY RXD/TAKEN: CPT | Mod: CPTII,S$GLB,, | Performed by: FAMILY MEDICINE

## 2024-11-07 PROCEDURE — 99999 PR PBB SHADOW E&M-EST. PATIENT-LVL IV: CPT | Mod: PBBFAC,,, | Performed by: FAMILY MEDICINE

## 2024-11-07 PROCEDURE — 3008F BODY MASS INDEX DOCD: CPT | Mod: CPTII,S$GLB,, | Performed by: FAMILY MEDICINE

## 2024-11-07 PROCEDURE — 99214 OFFICE O/P EST MOD 30 MIN: CPT | Mod: S$GLB,,, | Performed by: FAMILY MEDICINE

## 2024-11-07 PROCEDURE — 3044F HG A1C LEVEL LT 7.0%: CPT | Mod: CPTII,S$GLB,, | Performed by: FAMILY MEDICINE

## 2024-11-07 RX ORDER — LISINOPRIL 10 MG/1
10 TABLET ORAL DAILY
COMMUNITY
End: 2024-11-07

## 2024-11-07 RX ORDER — TRAMADOL HYDROCHLORIDE 50 MG/1
50 TABLET ORAL EVERY 6 HOURS
COMMUNITY

## 2024-11-07 RX ORDER — IBUPROFEN 800 MG/1
800 TABLET ORAL 3 TIMES DAILY
COMMUNITY

## 2024-11-07 NOTE — PROGRESS NOTES
Subjective:       Patient ID: Kaylie Madden is a 59 y.o. female.    Chief Complaint: Follow-up (Left shoulder and chest pain possibly a pulled muscle or maybe heart issue Went to the ED on the 31st. Swelling in ankle. )    History of Present Illness    CHIEF COMPLAINT:  Patient presents for follow-up after an emergency room visit for chest pain and to obtain a doctor's letter for fostering grandchildren.    Visit today included increased complexity associated with the care of the episodic problem listed below addressed and managing the longitudinal care of the patient due to the serious and/or complex managed problem(s) listed below.     HPI:  Patient recently visited [facility] Emergency Room due to chest pain. EKG, labs, urinalysis, and chest XRs were performed, concluding no acute heart attack at that time.     Patient reports fluid retention with occasional ankle edema. She takes lisinopril for blood pressure control but is uncertain if it is the combination medication (Zestoretic) that includes hydrochlorothiazide.    Patient reports chronic pain and joint issues, particularly in the knees. She receives steroid injections in the knees from ortho. She takes gabapentin and tramadol from a old prescription written by a former provider.  States she is on the last refill of those in his not sure what she is going to do next in terms of taking those medications.  She states Ibuprofen combined with gabapentin is effective for pain management.    Patient reports weight fluctuations, having previously lost weight on a carnivore diet, reaching approximately 168 lbs. She has since regained the weight after assuming custody of her grandchildren. She is now attempting to reinitiate weight loss efforts, including walking with a .  States the x-ray contributes to her chronic joint pain    6 months ago, the patient was in the prediabetes range. She is due for a mammogram in December and believes her last Pap smear  was within the past 3 years. Patient denies any limitations from chronic pain or joint issues that would affect her ability to care for foster children. She also denies feeling sad or depressed.      TEST RESULTS:  Six months ago, the patient's prediabetes lab results were in the prediabetes range. Her cholesterol levels were within normal limits at the same time. Recent cardiac markers showed no acute heart event. Patient recently underwent an EKG at [facility].    IMAGING:  A chest XR was recently performed at [facility]. Patient's mammogram is due in December. An at-home colon cancer screening test was ordered in January, but the results are unknown.    SOCIAL HISTORY:  Patient recently stopped employment to care for her grandchildren.      ROS:  General: -fever, -chills, -fatigue, -weight gain, +weight loss  Eyes: -vision changes, -redness, -discharge  ENT: -ear pain, -nasal congestion, -sore throat  Cardiovascular: +chest pain, -palpitations, -lower extremity edema  Respiratory: -cough, -shortness of breath  Gastrointestinal: -abdominal pain, -nausea, -vomiting, -diarrhea, -constipation, -blood in stool  Genitourinary: -dysuria, -hematuria, -frequency  Musculoskeletal: +joint pain, +muscle pain  Skin: -rash, -lesion  Neurological: -headache, -dizziness, -numbness, -tingling  Psychiatric: -anxiety, -depression, -sleep difficulty            Objective:      Physical Exam  Vitals reviewed.   Constitutional:       General: She is not in acute distress.     Appearance: Normal appearance. She is obese. She is not ill-appearing or toxic-appearing.   Cardiovascular:      Rate and Rhythm: Normal rate and regular rhythm.      Heart sounds: Normal heart sounds.   Pulmonary:      Effort: Pulmonary effort is normal.      Breath sounds: Normal breath sounds.   Abdominal:      General: Abdomen is flat. Bowel sounds are normal.      Palpations: Abdomen is soft.      Tenderness: There is no abdominal  "tenderness.   Neurological:      General: No focal deficit present.      Mental Status: She is alert and oriented to person, place, and time.   Psychiatric:         Mood and Affect: Mood normal.         Behavior: Behavior normal.              Blood pressure 132/80, pulse 75, resp. rate 16, height 4' 11" (1.499 m), weight 89.7 kg (197 lb 11.2 oz), SpO2 97%.Body mass index is 39.93 kg/m².          Assessment & Plan    Reviewed recent ER visit for chest pain; no acute cardiac event identified  Assessed blood pressure control on current medication regimen  Evaluated prediabetes status and lipid profile from previous labs  Discussed preventive health measures including cancer screenings  Considered weight management strategies in context of patient's joint pain and previous diet attempts    HYPERTENSION:  - Patient to check medication details at home and report back, particularly regarding lisinopril/Zestoretic.    Chronic PAIN:  - patient to determine what she is going to do in terms of pain management once her prescriptions run out    PREDIABETES and obesity:  - Prediabetes lab check ordered.    COLON CANCER SCREENING:  - Colon cancer screening kit ordered.    WEIGHT MANAGEMENT:  - Patient to resume walking routine with a friend for exercise and weight management.    Atypical chest pain:  - Patient declined cardiology follow-up and will inform office if desired.    BREAST CANCER SCREENING:  - Breast cancer screening due in December; office to determine if can be done in-network or needs to be scheduled elsewhere.    OTHER INSTRUCTIONS:  - Contact the office to provide letter stating no physical limitations for fostering grandchildren.    FOLLOW UP:  - Follow up in 6 months.          Risks, benefits, and side effects were discussed with the patient. All questions were answered to the fullest satisfaction of the patient, and pt verbalized understanding and agreement to treatment plan. Pt was to call with any new or " worsening symptoms, or present to the ER.         Maite Mccoy MD  Family Medicine Physician   [facility]     This note was created using [employer] voice recognition software that occasionally may misinterpret phrases or words.      1. Primary hypertension    2. Chronic pain syndrome    3. Atypical chest pain    4. Nonsmoker    5. Screening for colon cancer    6. Screening mammogram for breast cancer    7. Prediabetes      This note was generated with the assistance of ambient listening technology. Verbal consent was obtained by the patient and accompanying visitor(s) for the recording of patient appointment to facilitate this note. I attest to having reviewed and edited the generated note for accuracy, though some syntax or spelling errors may persist. Please contact the author of this note for any clarification.

## 2024-11-07 NOTE — LETTER
November 7, 2024      [facility]  [address] [place] [address]  Phone: [phone]       Patient: Kaylie Madden   YOB: 1965  Date of Visit: 11/07/2024    To Whom It May Concern:    Hermann Madden  was at [facility] on 11/07/2024. At this time there is no significant physical or mental limitation towards being able fostering children. If you have any questions or concerns, or if I can be of further assistance, please do not hesitate to contact me.    Sincerely,         Maite Mccoy MD

## 2024-11-13 ENCOUNTER — TELEPHONE (OUTPATIENT)
Dept: ORTHOPEDICS | Facility: CLINIC | Age: 59
End: 2024-11-13
Payer: COMMERCIAL

## 2024-11-13 NOTE — TELEPHONE ENCOUNTER
----- Message from Caro sent at 11/13/2024  9:36 AM CST -----  Regarding: sooner apt  Contact: pt  Type:  Sooner Appointment Request    Caller is requesting a sooner appointment.  Caller declined first available appointment listed below.  Caller will not accept being placed on the waitlist and is requesting a message be sent to doctor.    Name of Caller:  patient   When is the first available appointment?    Symptoms:  pain is getting worse  Would the patient rather a call back or a response via [facility]?   [facility] Call Back Number: [phone]    Additional Information:  needs a sooner apt than 11/21/24 call to be seen

## 2024-11-14 ENCOUNTER — TELEPHONE (OUTPATIENT)
Dept: ORTHOPEDICS | Facility: CLINIC | Age: 59
End: 2024-11-14
Payer: COMMERCIAL

## 2024-11-14 ENCOUNTER — OFFICE VISIT (OUTPATIENT)
Dept: ORTHOPEDICS | Facility: CLINIC | Age: 59
End: 2024-11-14
Payer: COMMERCIAL

## 2024-11-14 VITALS — HEIGHT: 59 IN | WEIGHT: 196 LBS | BODY MASS INDEX: 39.51 KG/M2

## 2024-11-14 DIAGNOSIS — M17.0 PRIMARY OSTEOARTHRITIS OF BOTH KNEES: Primary | ICD-10-CM

## 2024-11-14 PROCEDURE — 99999 PR PBB SHADOW E&M-EST. PATIENT-LVL III: CPT | Mod: PBBFAC,,, | Performed by: ORTHOPAEDIC SURGERY

## 2024-11-14 RX ORDER — TRIAMCINOLONE ACETONIDE 40 MG/ML
40 INJECTION, SUSPENSION INTRA-ARTICULAR; INTRAMUSCULAR
Status: DISCONTINUED | OUTPATIENT
Start: 2024-11-14 | End: 2024-11-14 | Stop reason: HOSPADM

## 2024-11-14 RX ADMIN — TRIAMCINOLONE ACETONIDE 40 MG: 40 INJECTION, SUSPENSION INTRA-ARTICULAR; INTRAMUSCULAR at 01:11

## 2024-11-14 NOTE — PROGRESS NOTES
Subjective:      Patient ID: Kaylie Madden is a 59 y.o. female.    Chief Complaint: Pain of the Right Knee and Pain and Swelling of the Left Knee    HPI  Patient follow up on bilateral today left greater than right knee pain.  She had acute flare-up several days ago after doing some walking.  ROS      Objective:    Ortho Exam     Constitutional:   Patient is alert  and oriented in no acute distress  HEENT:  normocephalic atraumatic; PERRL EOMI  Neck:  Supple without adenopathy  Cardiovascular:  Normal rate and rhythm  Pulmonary:  Normal respiratory effort normal chest wall expansion  Abdominal:  Nonprotuberant nondistended  Musculoskeletal:  Patient has a moderately antalgic gait she has crepitus with range of motion  He was no increased warmth or erythema she has a trace effusion of the left knee  Neurological:  No focal defect; cranial nerves 2-12 grossly intact  Psychiatric/behavioral:  Mood and behavior normal      X-Ray Knee 3 View Left  EXAMINATION:  XR KNEE 3 VIEW LEFT    CLINICAL HISTORY:  Pain in right knee    TECHNIQUE:  AP, lateral, and Merchant views of the left knee were performed.    COMPARISON:  12/28/2023.    FINDINGS:  Mild-to-moderate tricompartmental degenerative osteoarthrosis most predominant within the patellofemoral articulation.  No significant suprapatellar effusion.    Electronically signed by: Edward Yañez  Date:    03/14/2024  Time:    08:54       My Radiographs Findings:    I  Assessment:       Encounter Diagnosis   Name Primary?    Primary osteoarthritis of both knees Yes         Plan:       I have discussed medical condition treatment options with her at length.  After a verbal consent and sterile prep I injected her most symptomatic left knee today without complication.  Continue with the NSAIDs if approved by her PCP.  She may slowly advance activities and follow up as needed.  Consider knee replacement in future if symptoms fail to improve.        History reviewed. No pertinent  past medical history.  Past Surgical History:   Procedure Laterality Date     SECTION      FOOT SURGERY           Current Outpatient Medications:     gabapentin (NEURONTIN) 600 MG tablet, Take 600 mg by mouth., Disp: , Rfl:     ibuprofen (ADVIL,MOTRIN) 800 MG tablet, Take 800 mg by mouth 3 (three) times daily., Disp: , Rfl:     lisinopriL-hydrochlorothiazide (PRINZIDE,ZESTORETIC) 20-12.5 mg per tablet, Take 2 tablets by mouth once daily., Disp: 180 tablet, Rfl: 1    traMADoL (ULTRAM) 50 mg tablet, Take 50 mg by mouth every 6 (six) hours., Disp: , Rfl:     Review of patient's allergies indicates:   Allergen Reactions    Metaxalone Nausea And Vomiting    Cephalexin Other (See Comments)    Corticosteroids (glucocorticoids) Other (See Comments)     rage       Family History   Problem Relation Name Age of Onset    Diabetes Mother      Hypertension Mother       Social History     Occupational History    Not on file   Tobacco Use    Smoking status: Never     Passive exposure: Never    Smokeless tobacco: Never   Substance and Sexual Activity    Alcohol use: No    Drug use: No    Sexual activity: Yes     Partners: Male     Birth control/protection: None

## 2024-11-14 NOTE — PROCEDURES
Large Joint Aspiration/Injection: L knee    Date/Time: 11/14/2024 1:45 PM    Performed by: Andrey Negron MD  Authorized by: Andrey Negron MD    Consent Done?:  Yes (Verbal)  Indications:  Pain  Site marked: the procedure site was marked    Timeout: prior to procedure the correct patient, procedure, and site was verified    Local anesthetic:  Lidocaine 1% without epinephrine and bupivacaine 0.25% without epinephrine  Anesthetic total (ml):  6      Details:  Needle Size:  20 G  Approach:  Anterolateral  Location:  Knee  Site:  L knee  Medications:  40 mg triamcinolone acetonide 40 mg/mL  Patient tolerance:  Patient tolerated the procedure well with no immediate complications

## 2024-11-14 NOTE — TELEPHONE ENCOUNTER
----- Message from Med Assistant Sykes sent at 11/14/2024  7:41 AM CST -----  Contact: pt  Calling to reschedule appt to 3:45 today   Call back  [phone]

## 2024-11-14 NOTE — TELEPHONE ENCOUNTER
Spoke with pt advised no later times available however she can come earlier if able. Pt stated they are waiting on someone to arrive to check on foster child but will come as soon as she can. Advised we are at lunch from 12-1pm but she is welcome to come before the 1:45 pm appointment.   Pt verbalized understanding.

## 2024-12-16 ENCOUNTER — TELEPHONE (OUTPATIENT)
Dept: ORTHOPEDICS | Facility: CLINIC | Age: 59
End: 2024-12-16
Payer: COMMERCIAL

## 2024-12-16 DIAGNOSIS — M17.0 PRIMARY OSTEOARTHRITIS OF BOTH KNEES: Primary | ICD-10-CM

## 2024-12-16 NOTE — TELEPHONE ENCOUNTER
Last injection was done on 11/14/2024- L knee  Pt is Not doing PT at the moment.   Does not want narcotics or pain medication. States she needs to do something because she cannot walk.     Sent secure chat for further advisement to Dr. Negron.

## 2024-12-16 NOTE — TELEPHONE ENCOUNTER
----- Message from Avery Sykes sent at 12/16/2024  9:36 AM CST -----  Contact: pt  Last injection 11/14/2024   Pt wants another injection   Call back

## 2024-12-18 ENCOUNTER — TELEPHONE (OUTPATIENT)
Dept: FAMILY MEDICINE | Facility: CLINIC | Age: 59
End: 2024-12-18
Payer: COMMERCIAL

## 2024-12-18 NOTE — TELEPHONE ENCOUNTER
----- Message from Nigel sent at 12/18/2024  9:58 AM CST -----  Regarding: advice  Contact: patient  Type:  Needs Medical Advice    Who Called: patient  Symptoms (please be specific): flu like symptoms/ getting worse    How long has patient had these symptoms:  2 days  Pharmacy name and phone #:      [facility] - [place] - [address]  [address]  [place] [address]  Phone: [phone] Fax: [phone]    Would the patient rather a call back or a response via [facility]? New medication/ new pharmacy  Best Call Back Number: [phone]  Additional Information: Patient is requesting Tamiflu

## 2024-12-30 ENCOUNTER — OFFICE VISIT (OUTPATIENT)
Dept: ORTHOPEDICS | Facility: CLINIC | Age: 59
End: 2024-12-30
Payer: COMMERCIAL

## 2024-12-30 ENCOUNTER — HOSPITAL ENCOUNTER (OUTPATIENT)
Dept: RADIOLOGY | Facility: HOSPITAL | Age: 59
Discharge: HOME | End: 2024-12-30
Attending: ORTHOPAEDIC SURGERY
Payer: COMMERCIAL

## 2024-12-30 VITALS — BODY MASS INDEX: 40.95 KG/M2 | HEIGHT: 59 IN | WEIGHT: 203.13 LBS

## 2024-12-30 DIAGNOSIS — G89.29 CHRONIC PAIN OF RIGHT KNEE: Primary | ICD-10-CM

## 2024-12-30 DIAGNOSIS — M17.0 PRIMARY OSTEOARTHRITIS OF BOTH KNEES: Primary | ICD-10-CM

## 2024-12-30 DIAGNOSIS — M25.561 CHRONIC PAIN OF RIGHT KNEE: ICD-10-CM

## 2024-12-30 DIAGNOSIS — G89.29 CHRONIC PAIN OF RIGHT KNEE: ICD-10-CM

## 2024-12-30 DIAGNOSIS — M25.561 CHRONIC PAIN OF RIGHT KNEE: Primary | ICD-10-CM

## 2024-12-30 PROCEDURE — 73562 X-RAY EXAM OF KNEE 3: CPT | Mod: 26,RT,, | Performed by: RADIOLOGY

## 2024-12-30 PROCEDURE — 99999 PR PBB SHADOW E&M-EST. PATIENT-LVL III: CPT | Mod: PBBFAC,,, | Performed by: ORTHOPAEDIC SURGERY

## 2024-12-30 PROCEDURE — 99214 OFFICE O/P EST MOD 30 MIN: CPT | Mod: 25,S$GLB,, | Performed by: ORTHOPAEDIC SURGERY

## 2024-12-30 PROCEDURE — 1160F RVW MEDS BY RX/DR IN RCRD: CPT | Mod: CPTII,S$GLB,, | Performed by: ORTHOPAEDIC SURGERY

## 2024-12-30 PROCEDURE — 20610 DRAIN/INJ JOINT/BURSA W/O US: CPT | Mod: RT,S$GLB,, | Performed by: ORTHOPAEDIC SURGERY

## 2024-12-30 PROCEDURE — 3044F HG A1C LEVEL LT 7.0%: CPT | Mod: CPTII,S$GLB,, | Performed by: ORTHOPAEDIC SURGERY

## 2024-12-30 PROCEDURE — 1159F MED LIST DOCD IN RCRD: CPT | Mod: CPTII,S$GLB,, | Performed by: ORTHOPAEDIC SURGERY

## 2024-12-30 PROCEDURE — 4010F ACE/ARB THERAPY RXD/TAKEN: CPT | Mod: CPTII,S$GLB,, | Performed by: ORTHOPAEDIC SURGERY

## 2024-12-30 PROCEDURE — 73562 X-RAY EXAM OF KNEE 3: CPT | Mod: TC,PN,RT

## 2024-12-30 PROCEDURE — 3008F BODY MASS INDEX DOCD: CPT | Mod: CPTII,S$GLB,, | Performed by: ORTHOPAEDIC SURGERY

## 2024-12-30 RX ORDER — TRIAMCINOLONE ACETONIDE 40 MG/ML
40 INJECTION, SUSPENSION INTRA-ARTICULAR; INTRAMUSCULAR
Status: DISCONTINUED | OUTPATIENT
Start: 2024-12-30 | End: 2024-12-30 | Stop reason: HOSPADM

## 2024-12-30 RX ADMIN — TRIAMCINOLONE ACETONIDE 40 MG: 40 INJECTION, SUSPENSION INTRA-ARTICULAR; INTRAMUSCULAR at 09:12

## 2024-12-30 NOTE — PROCEDURES
Large Joint Aspiration/Injection: R knee    Date/Time: 12/30/2024 9:00 AM    Performed by: Andrey Negron MD  Authorized by: Andrey Negron MD    Consent Done?:  Yes (Verbal)  Indications:  Pain  Site marked: the procedure site was marked    Timeout: prior to procedure the correct patient, procedure, and site was verified    Local anesthetic: Ropivicaine.  Anesthetic total (ml):  3      Details:  Needle Size:  20 G  Approach:  Anterolateral  Location:  Knee  Site:  R knee  Medications:  40 mg triamcinolone acetonide 40 mg/mL  Patient tolerance:  Patient tolerated the procedure well with no immediate complications

## 2024-12-30 NOTE — PROGRESS NOTES
Subjective:      Patient ID: Kaylie Madden is a 59 y.o. female.    Chief Complaint: Pain of the Right Knee and Pain and Swelling of the Left Knee (Worse)    HPI  The patient is in for follow up with the ongoing bilateral left greater than right knee pain.  She got some short-term relief from her previous left knee injection.  Pain is significantly beginning to interfere with activities of daily living.  ROS      Objective:    Ortho Exam     Constitutional:   Patient is alert  and oriented in no acute distress  HEENT:  normocephalic atraumatic; PERRL EOMI  Neck:  Supple without adenopathy  Cardiovascular:  Normal rate and rhythm  Pulmonary:  Normal respiratory effort normal chest wall expansion  Abdominal:  Nonprotuberant nondistended  Musculoskeletal:  Patient has a mildly antalgic gait  She has crepitus with range of motion diffuse joint line tenderness of both knees  Neurological:  No focal defect; cranial nerves 2-12 grossly intact  Psychiatric/behavioral:  Mood and behavior normal      X-Ray Knee 3 View Right  Narrative: EXAMINATION:  XR KNEE 3 VIEW RIGHT    CLINICAL HISTORY:  Pain in right knee    TECHNIQUE:  AP, lateral, and Merchant views of the right knee were performed.    COMPARISON:  03/14/2024    FINDINGS:  No fracture or dislocation.  There is a small joint effusion.  A 8 mm os ossific body is present posteriorly for which a loose body is possible.  Moderate lateral compartment joint space loss is present there is prominent superior patellofemoral spur formation.  Impression: Moderate osteoarthritis.  Small joint effusion.  Possible posterior loose body    Electronically signed by: Edward Sue MD  Date:    12/30/2024  Time:    09:24       My Radiographs Findings:    I have personally reviewed radiographs and concur with above findings    Assessment:       Encounter Diagnosis   Name Primary?    Primary osteoarthritis of both knees Yes         Plan:       I have discussed medical condition  treatment options with her at length.  After a verbal consent and sterile prep I injected her right knee today.  She would like to go ahead and make plans for her left knee in the upcoming year for replacement.  We have discussed Celebrex alternating with Tylenol ice elevation compressive wrapping continue with general knee rehab.  Follow up in 6 weeks sooner if any questions or problems.        History reviewed. No pertinent past medical history.  Past Surgical History:   Procedure Laterality Date     SECTION      FOOT SURGERY           Current Outpatient Medications:     ibuprofen (ADVIL,MOTRIN) 800 MG tablet, Take 800 mg by mouth 3 (three) times daily., Disp: , Rfl:     lisinopriL-hydrochlorothiazide (PRINZIDE,ZESTORETIC) 20-12.5 mg per tablet, Take 2 tablets by mouth once daily., Disp: 180 tablet, Rfl: 1    gabapentin (NEURONTIN) 600 MG tablet, Take 600 mg by mouth., Disp: , Rfl:     traMADoL (ULTRAM) 50 mg tablet, Take 50 mg by mouth every 6 (six) hours., Disp: , Rfl:     Review of patient's allergies indicates:   Allergen Reactions    Metaxalone Nausea And Vomiting    Cephalexin Other (See Comments)    Corticosteroids (glucocorticoids) Other (See Comments)     rage       Family History   Problem Relation Name Age of Onset    Diabetes Mother      Hypertension Mother       Social History     Occupational History    Not on file   Tobacco Use    Smoking status: Never     Passive exposure: Never    Smokeless tobacco: Never   Substance and Sexual Activity    Alcohol use: No    Drug use: No    Sexual activity: Yes     Partners: Male     Birth control/protection: None

## 2025-02-19 ENCOUNTER — TELEPHONE (OUTPATIENT)
Dept: FAMILY MEDICINE | Facility: CLINIC | Age: 60
End: 2025-02-19
Payer: COMMERCIAL

## 2025-02-19 NOTE — TELEPHONE ENCOUNTER
----- Message from Jackelyn sent at 2/19/2025  3:55 PM CST -----  Type:  Needs Medical Advice Who Called: Pt  Symptoms (please be specific): NAHvalarie long has patient had these symptoms:  NAPharmacy name and phone #:  NAWould the patient rather a call back or a response via [facility]? Call Back [facility] Call Back Number: [phone] Information: Pt is calling about getting documentation filled out to become a  and pt also needs a TB test done      Please call Back to advise. Thanks!

## 2025-02-21 ENCOUNTER — TELEPHONE (OUTPATIENT)
Dept: FAMILY MEDICINE | Facility: CLINIC | Age: 60
End: 2025-02-21

## 2025-02-21 ENCOUNTER — PATIENT MESSAGE (OUTPATIENT)
Dept: ADMINISTRATIVE | Facility: OTHER | Age: 60
End: 2025-02-21
Payer: COMMERCIAL

## 2025-02-21 ENCOUNTER — LAB VISIT (OUTPATIENT)
Dept: LAB | Facility: CLINIC | Age: 60
End: 2025-02-21
Payer: COMMERCIAL

## 2025-02-21 ENCOUNTER — RESULTS FOLLOW-UP (OUTPATIENT)
Dept: FAMILY MEDICINE | Facility: CLINIC | Age: 60
End: 2025-02-21

## 2025-02-21 ENCOUNTER — OFFICE VISIT (OUTPATIENT)
Dept: FAMILY MEDICINE | Facility: CLINIC | Age: 60
End: 2025-02-21
Payer: COMMERCIAL

## 2025-02-21 VITALS
HEART RATE: 92 BPM | OXYGEN SATURATION: 98 % | DIASTOLIC BLOOD PRESSURE: 80 MMHG | HEIGHT: 59 IN | BODY MASS INDEX: 41.73 KG/M2 | WEIGHT: 207 LBS | SYSTOLIC BLOOD PRESSURE: 138 MMHG

## 2025-02-21 DIAGNOSIS — Z12.31 ENCOUNTER FOR SCREENING MAMMOGRAM FOR MALIGNANT NEOPLASM OF BREAST: ICD-10-CM

## 2025-02-21 DIAGNOSIS — Z76.89 ENCOUNTER TO ESTABLISH CARE: ICD-10-CM

## 2025-02-21 DIAGNOSIS — M25.562 CHRONIC PAIN OF LEFT KNEE: ICD-10-CM

## 2025-02-21 DIAGNOSIS — Z12.4 CERVICAL CANCER SCREENING: ICD-10-CM

## 2025-02-21 DIAGNOSIS — Z11.1 TUBERCULOSIS SCREENING: ICD-10-CM

## 2025-02-21 DIAGNOSIS — Z11.4 ENCOUNTER FOR SCREENING FOR HIV: ICD-10-CM

## 2025-02-21 DIAGNOSIS — E78.2 MIXED HYPERLIPIDEMIA: ICD-10-CM

## 2025-02-21 DIAGNOSIS — R73.03 PREDIABETES: Primary | ICD-10-CM

## 2025-02-21 DIAGNOSIS — G89.29 CHRONIC PAIN OF LEFT KNEE: ICD-10-CM

## 2025-02-21 DIAGNOSIS — R73.03 PREDIABETES: ICD-10-CM
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
## 2025-02-21 PROBLEM — Z91.148 NONCOMPLIANCE WITH MEDICATION TREATMENT DUE TO ABUSE OF MEDICATION: Status: RESOLVED | Noted: 2023-12-21 | Resolved: 2025-02-21

## 2025-02-21 LAB
ALBUMIN SERPL BCP-MCNC: 3.9 G/DL (ref 3.5–5.2)
ALP SERPL-CCNC: 66 U/L (ref 40–150)
ALT SERPL W/O P-5'-P-CCNC: 14 U/L (ref 10–44)
ANION GAP SERPL CALC-SCNC: 10 MMOL/L (ref 8–16)
AST SERPL-CCNC: 16 U/L (ref 10–40)
BASOPHILS # BLD AUTO: 0.02 K/UL (ref 0–0.2)
BASOPHILS NFR BLD: 0.3 % (ref 0–1.9)
BILIRUB SERPL-MCNC: 0.6 MG/DL (ref 0.1–1)
BUN SERPL-MCNC: 15 MG/DL (ref 6–20)
CALCIUM SERPL-MCNC: 8.6 MG/DL (ref 8.7–10.5)
CHLORIDE SERPL-SCNC: 104 MMOL/L (ref 95–110)
CHOLEST SERPL-MCNC: 224 MG/DL (ref 120–199)
CHOLEST/HDLC SERPL: 3.3 {RATIO} (ref 2–5)
CO2 SERPL-SCNC: 26 MMOL/L (ref 23–29)
CREAT SERPL-MCNC: 1 MG/DL (ref 0.5–1.4)
DIFFERENTIAL METHOD BLD: ABNORMAL
EOSINOPHIL # BLD AUTO: 0.6 K/UL (ref 0–0.5)
EOSINOPHIL NFR BLD: 9.8 % (ref 0–8)
ERYTHROCYTE [DISTWIDTH] IN BLOOD BY AUTOMATED COUNT: 13 % (ref 11.5–14.5)
EST. GFR  (NO RACE VARIABLE): >60 ML/MIN/1.73 M^2
ESTIMATED AVG GLUCOSE: 123 MG/DL (ref 68–131)
GLUCOSE SERPL-MCNC: 157 MG/DL (ref 70–110)
HBA1C MFR BLD: 5.9 % (ref 4–5.6)
HCT VFR BLD AUTO: 39 % (ref 37–48.5)
HDLC SERPL-MCNC: 67 MG/DL (ref 40–75)
HDLC SERPL: 29.9 % (ref 20–50)
HGB BLD-MCNC: 12.3 G/DL (ref 12–16)
HIV 1+2 AB+HIV1 P24 AG SERPL QL IA: NORMAL
IMM GRANULOCYTES # BLD AUTO: 0.02 K/UL (ref 0–0.04)
IMM GRANULOCYTES NFR BLD AUTO: 0.3 % (ref 0–0.5)
LDLC SERPL CALC-MCNC: 124 MG/DL (ref 63–159)
LYMPHOCYTES # BLD AUTO: 1.1 K/UL (ref 1–4.8)
LYMPHOCYTES NFR BLD: 17.4 % (ref 18–48)
MCH RBC QN AUTO: 28.7 PG (ref 27–31)
MCHC RBC AUTO-ENTMCNC: 31.5 G/DL (ref 32–36)
MCV RBC AUTO: 91 FL (ref 82–98)
MONOCYTES # BLD AUTO: 0.4 K/UL (ref 0.3–1)
MONOCYTES NFR BLD: 6.1 % (ref 4–15)
NEUTROPHILS # BLD AUTO: 4.1 K/UL (ref 1.8–7.7)
NEUTROPHILS NFR BLD: 66.1 % (ref 38–73)
NONHDLC SERPL-MCNC: 157 MG/DL
NRBC BLD-RTO: 0 /100 WBC
PLATELET # BLD AUTO: 169 K/UL (ref 150–450)
PMV BLD AUTO: 9.8 FL (ref 9.2–12.9)
POTASSIUM SERPL-SCNC: 4.2 MMOL/L (ref 3.5–5.1)
PROT SERPL-MCNC: 6.6 G/DL (ref 6–8.4)
RBC # BLD AUTO: 4.29 M/UL (ref 4–5.4)
SODIUM SERPL-SCNC: 140 MMOL/L (ref 136–145)
TRIGL SERPL-MCNC: 165 MG/DL (ref 30–150)
WBC # BLD AUTO: 6.22 K/UL (ref 3.9–12.7)

## 2025-02-21 PROCEDURE — 36415 COLL VENOUS BLD VENIPUNCTURE: CPT | Mod: ,,, | Performed by: STUDENT IN AN ORGANIZED HEALTH CARE EDUCATION/TRAINING PROGRAM

## 2025-02-21 PROCEDURE — 86480 TB TEST CELL IMMUN MEASURE: CPT | Performed by: STUDENT IN AN ORGANIZED HEALTH CARE EDUCATION/TRAINING PROGRAM

## 2025-02-21 PROCEDURE — 80061 LIPID PANEL: CPT | Performed by: STUDENT IN AN ORGANIZED HEALTH CARE EDUCATION/TRAINING PROGRAM

## 2025-02-21 PROCEDURE — 80053 COMPREHEN METABOLIC PANEL: CPT | Performed by: STUDENT IN AN ORGANIZED HEALTH CARE EDUCATION/TRAINING PROGRAM

## 2025-02-21 PROCEDURE — 87389 HIV-1 AG W/HIV-1&-2 AB AG IA: CPT | Performed by: STUDENT IN AN ORGANIZED HEALTH CARE EDUCATION/TRAINING PROGRAM

## 2025-02-21 PROCEDURE — 83036 HEMOGLOBIN GLYCOSYLATED A1C: CPT | Performed by: STUDENT IN AN ORGANIZED HEALTH CARE EDUCATION/TRAINING PROGRAM

## 2025-02-21 PROCEDURE — 85025 COMPLETE CBC W/AUTO DIFF WBC: CPT | Performed by: STUDENT IN AN ORGANIZED HEALTH CARE EDUCATION/TRAINING PROGRAM

## 2025-02-21 RX ORDER — TRAMADOL HYDROCHLORIDE 50 MG/1
50 TABLET ORAL EVERY 6 HOURS
Qty: 12 TABLET | Refills: 0 | Status: SHIPPED | OUTPATIENT
Start: 2025-02-21

## 2025-02-21 RX ORDER — GABAPENTIN 600 MG/1
600 TABLET ORAL 3 TIMES DAILY
Qty: 90 TABLET | Refills: 5 | Status: SHIPPED | OUTPATIENT
Start: 2025-02-21

## 2025-02-21 NOTE — TELEPHONE ENCOUNTER
----- Message from Harley Contreras MD sent at 2/21/2025  2:13 PM CST -----  Regarding: FW: Patient Advice  Can you see what this patient needs, please?  ----- Message -----  From: Yanelis Sepulveda  Sent: 2/21/2025   1:56 PM CST  To: Harley Contreras MD  Subject: Patient Advice                                   Walk-in to [facility]. Patient has questions regarding her medication. Patient can be contacted at [phone].

## 2025-02-21 NOTE — TELEPHONE ENCOUNTER
Spoke with patient she is requesting a OK from Dr Contreras to get a early refill on Gabapentin pharmacy told her she was not eligible for refill until 3/5/5025 unless he called and approved .

## 2025-02-21 NOTE — PROGRESS NOTES
"  [facility]  [address], [address]  [place] [address]    Subjective     Patient ID: Kaylie Madden is a 60 y.o. female who comes to the clinic to establish care.    Chief Complaint: Health Maintenance    HTN - takes lisinopril-HCTZ 20-12.5mg twice daily     Chronic left knee pain - is scheduled to have left knee replacement in March. Takes gabapentin 600mg TID and PRN tramadol    ROS negative unless stated above       Objective     Vitals:    02/21/25 0810   BP: 138/80   BP Location: Left arm   Patient Position: Sitting   Pulse: 92   SpO2: 98%   Weight: 93.9 kg (207 lb)   Height: 4' 11" (1.499 m)        Wt Readings from Last 3 Encounters:   02/21/25 0810 93.9 kg (207 lb)   12/30/24 0921 92.1 kg (203 lb 1.6 oz)   11/14/24 1050 88.9 kg (196 lb)        Physical Exam  Vitals reviewed.   Constitutional:       Appearance: Normal appearance.   HENT:      Head: Normocephalic and atraumatic.   Eyes:      Extraocular Movements: Extraocular movements intact.      Pupils: Pupils are equal, round, and reactive to light.   Cardiovascular:      Rate and Rhythm: Normal rate.      Pulses: Normal pulses.      Heart sounds: Normal heart sounds.   Pulmonary:      Effort: Pulmonary effort is normal. No respiratory distress.      Breath sounds: Normal breath sounds.   Musculoskeletal:         General: Normal range of motion.      Cervical back: Normal range of motion and neck supple.   Skin:     General: Skin is dry.      Capillary Refill: Capillary refill takes less than 2 seconds.   Neurological:      General: No focal deficit present.      Mental Status: She is alert and oriented to person, place, and time. Mental status is at baseline.   Psychiatric:         Mood and Affect: Mood normal.         Behavior: Behavior normal.         Thought Content: Thought content normal.         Current Outpatient Medications   Medication Instructions    gabapentin (NEURONTIN) 600 mg, " Oral, 3 times daily    ibuprofen (ADVIL,MOTRIN) 800 mg, 3 times daily    lisinopriL-hydrochlorothiazide (PRINZIDE,ZESTORETIC) 20-12.5 mg per tablet 2 tablets, Oral, Daily    traMADoL (ULTRAM) 50 mg, Oral, Every 6 hours           Assessment and Plan     1. Prediabetes  -     Comprehensive metabolic panel; Future; Expected date: 02/21/2025  -     CBC auto differential; Future; Expected date: 02/21/2025  -     Hemoglobin A1c; Future; Expected date: 02/21/2025    2. Tuberculosis screening  -     Quantiferon Gold TB; Future; Expected date: 02/21/2025    3. Encounter to establish care    4. Encounter for screening for HIV  -     HIV 1/2 Ag/Ab (4th Gen); Future; Expected date: 02/21/2025    5. Mixed hyperlipidemia  -     Lipid panel; Future; Expected date: 02/21/2025    6. Chronic pain of left knee  -     traMADoL (ULTRAM) 50 mg tablet; Take 1 tablet (50 mg total) by mouth every 6 (six) hours.  Dispense: 12 tablet; Refill: 0  -     gabapentin (NEURONTIN) 600 MG tablet; Take 1 tablet (600 mg total) by mouth 3 (three) times daily.  Dispense: 90 tablet; Refill: 5    7. Encounter for screening mammogram for malignant neoplasm of breast  -     Mammo Digital Screening Bilat w/ Edy (XPD); Future; Expected date: 02/21/2025    8. Cervical cancer screening  -     Ambulatory referral/consult to Obstetrics / Gynecology; Future; Expected date: 02/28/2025        Here to establish care    Patient is trying to adopt her grandchild.  We will like me to perform a TB test and felt some paperwork for this    For hypertension, at goal, continue lisinopril and HCTZ    For chronic right knee pain, I will refill her Ultram, PDMP reviewed with no concerning findings though she does get gabapentin.  I will refill her gabapentin    Scheduling mammogram at [facility]    She said she mailed off her fit kit 3 weeks ago but it has not come back yet.  If it is not back in 1 month, we will have to redo it    Referring to [facility] for a Pap  smear    Labs as above    RTC in 1 month, lab review, HM items    I encouraged the patient to take all medications as prescribed and to keep follow up appointments with their providers. ED precautions given more concerning issues. Questions were invited and answered. Patient stated they had no other concerns. Follow up sooner if needed.     Harley Contreras MD  02/21/2025 8:17 AM

## 2025-02-24 LAB
GAMMA INTERFERON BACKGROUND BLD IA-ACNC: 0.01 IU/ML
M TB IFN-G CD4+ BCKGRND COR BLD-ACNC: 0.02 IU/ML
M TB IFN-G CD4+ BCKGRND COR BLD-ACNC: 0.02 IU/ML
MITOGEN IGNF BCKGRD COR BLD-ACNC: 9.99 IU/ML
TB GOLD PLUS: NEGATIVE

## 2025-02-24 NOTE — TELEPHONE ENCOUNTER
----- Message from Harley Contreras MD sent at 2/24/2025 11:26 AM CST -----  Can you tell patient that her paperwork is ready to be picked up? Thanks  ----- Message -----  From: [employer]  Sent: 2/21/2025   2:35 PM CST  To: Harley Contreras MD

## 2025-03-21 ENCOUNTER — OFFICE VISIT (OUTPATIENT)
Dept: FAMILY MEDICINE | Facility: CLINIC | Age: 60
End: 2025-03-21
Attending: STUDENT IN AN ORGANIZED HEALTH CARE EDUCATION/TRAINING PROGRAM
Payer: COMMERCIAL

## 2025-03-21 ENCOUNTER — PATIENT MESSAGE (OUTPATIENT)
Dept: ADMINISTRATIVE | Facility: OTHER | Age: 60
End: 2025-03-21
Payer: COMMERCIAL

## 2025-03-21 VITALS
OXYGEN SATURATION: 97 % | HEART RATE: 78 BPM | RESPIRATION RATE: 18 BRPM | BODY MASS INDEX: 41.53 KG/M2 | HEIGHT: 59 IN | SYSTOLIC BLOOD PRESSURE: 147 MMHG | DIASTOLIC BLOOD PRESSURE: 82 MMHG | WEIGHT: 206 LBS

## 2025-03-21 DIAGNOSIS — Z12.11 COLON CANCER SCREENING: ICD-10-CM

## 2025-03-21 DIAGNOSIS — M25.562 CHRONIC PAIN OF LEFT KNEE: ICD-10-CM

## 2025-03-21 DIAGNOSIS — Z12.31 ENCOUNTER FOR SCREENING MAMMOGRAM FOR MALIGNANT NEOPLASM OF BREAST: ICD-10-CM

## 2025-03-21 DIAGNOSIS — G89.29 CHRONIC PAIN OF LEFT KNEE: ICD-10-CM

## 2025-03-21 DIAGNOSIS — R73.03 PREDIABETES: ICD-10-CM

## 2025-03-21 DIAGNOSIS — E66.01 MORBID OBESITY WITH BMI OF 40.0-44.9, ADULT: ICD-10-CM

## 2025-03-21 DIAGNOSIS — F41.9 ANXIETY: ICD-10-CM

## 2025-03-21 DIAGNOSIS — I10 PRIMARY HYPERTENSION: Primary | ICD-10-CM

## 2025-03-21 DIAGNOSIS — Z12.4 CERVICAL CANCER SCREENING: ICD-10-CM

## 2025-03-21 RX ORDER — ESCITALOPRAM OXALATE 10 MG/1
10 TABLET ORAL DAILY
Qty: 90 TABLET | Refills: 1 | Status: SHIPPED | OUTPATIENT
Start: 2025-03-21 | End: 2026-03-21

## 2025-03-21 RX ORDER — SEMAGLUTIDE 0.25 MG/.5ML
0.25 INJECTION, SOLUTION SUBCUTANEOUS
Qty: 2 ML | Refills: 0 | Status: SHIPPED | OUTPATIENT
Start: 2025-03-21

## 2025-03-21 RX ORDER — TRAMADOL HYDROCHLORIDE 50 MG/1
50 TABLET ORAL EVERY 6 HOURS
Qty: 12 TABLET | Refills: 0 | Status: SHIPPED | OUTPATIENT
Start: 2025-03-21

## 2025-03-21 RX ORDER — LISINOPRIL AND HYDROCHLOROTHIAZIDE 12.5; 2 MG/1; MG/1
2 TABLET ORAL DAILY
Qty: 180 TABLET | Refills: 1 | Status: SHIPPED | OUTPATIENT
Start: 2025-03-21

## 2025-03-21 NOTE — PROGRESS NOTES
"  [facility]    [facility]  [address], [address]  [place] [address]    Subjective     Patient ID: Kaylie Madden is a 60 y.o. female who comes to the clinic for a follow up visit.    Chief Complaint: managment of chronic conditions  (Follow up right ankle swelling )    HTN - takes lisinopril-HCTZ 20-12.5mg twice daily     Anxiety - feels like it's worsened as of late, no SI or HI     Prediabetes - A1c of 5.9, on no meds    Morbid obesity - BMI of 41    Chronic left knee pain - was scheduled to have left knee replacement in March but she had to reschedule. Takes gabapentin 600mg TID and PRN tramadol    ROS negative unless stated above       Objective     Vitals:    03/21/25 0934   BP: (!) 154/80   BP Location: Left arm   Patient Position: Sitting   Pulse: 78   Resp: 18   SpO2: 97%   Weight: 93.4 kg (206 lb)   Height: 4' 11" (1.499 m)        Wt Readings from Last 3 Encounters:   03/21/25 0934 93.4 kg (206 lb)   02/21/25 0810 93.9 kg (207 lb)   12/30/24 0921 92.1 kg (203 lb 1.6 oz)        Physical Exam  Constitutional:       General: She is not in acute distress.     Appearance: Normal appearance. She is not ill-appearing.   HENT:      Head: Normocephalic and atraumatic.      Mouth/Throat:      Mouth: Mucous membranes are moist.   Eyes:      Extraocular Movements: Extraocular movements intact.      Pupils: Pupils are equal, round, and reactive to light.   Cardiovascular:      Rate and Rhythm: Normal rate.   Pulmonary:      Effort: Pulmonary effort is normal. No respiratory distress.   Musculoskeletal:         General: Normal range of motion.      Cervical back: Normal range of motion and neck supple.   Skin:     General: Skin is warm and dry.   Neurological:      General: No focal deficit present.      Mental Status: She is alert and oriented to person, place, and time. Mental status is at baseline.   Psychiatric:         Mood and Affect: Mood normal.         " Behavior: Behavior normal.         Thought Content: Thought content normal.         Current Outpatient Medications   Medication Instructions    EScitalopram oxalate (LEXAPRO) 10 mg, Oral, Daily    gabapentin (NEURONTIN) 600 mg, Oral, 3 times daily    ibuprofen (ADVIL,MOTRIN) 800 mg, 3 times daily    lisinopriL-hydrochlorothiazide (PRINZIDE,ZESTORETIC) 20-12.5 mg per tablet 2 tablets, Oral, Daily    traMADoL (ULTRAM) 50 mg, Oral, Every 6 hours    WEGOVY 0.25 mg, Subcutaneous, Every 7 days           Assessment and Plan     1. Primary hypertension  -     lisinopriL-hydrochlorothiazide (PRINZIDE,ZESTORETIC) 20-12.5 mg per tablet; Take 2 tablets by mouth once daily.  Dispense: 180 tablet; Refill: 1    2. Anxiety  -     EScitalopram oxalate (LEXAPRO) 10 MG tablet; Take 1 tablet (10 mg total) by mouth once daily.  Dispense: 90 tablet; Refill: 1    3. Colon cancer screening  -     Cologuard Screening (Multitarget Stool DNA); Future; Expected date: 03/21/2025    4. Encounter for screening mammogram for malignant neoplasm of breast  -     Mammo Digital Screening Bilat w/ Edy (XPD); Future; Expected date: 03/21/2025    5. Morbid obesity with BMI of 40.0-44.9, adult  -     semaglutide, weight loss, (WEGOVY) 0.25 mg/0.5 mL PnIj; Inject 0.25 mg into the skin every 7 days.  Dispense: 2 mL; Refill: 0    6. Prediabetes  -     semaglutide, weight loss, (WEGOVY) 0.25 mg/0.5 mL PnIj; Inject 0.25 mg into the skin every 7 days.  Dispense: 2 mL; Refill: 0    7. Cervical cancer screening  -     Ambulatory referral/consult to Obstetrics / Gynecology; Future; Expected date: 03/28/2025    8. Chronic pain of left knee  -     traMADoL (ULTRAM) 50 mg tablet; Take 1 tablet (50 mg total) by mouth every 6 (six) hours.  Dispense: 12 tablet; Refill: 0      Here for follow up.    For HTN, above goal but didn't take med today, refilling it as above    For anxiety, starting lexapro, went over SE's    For prediabetes and obesity, starting wegovy, no  personal or family history of medullary thyroid cancer or multiple endocrine neoplasia, no personal history of pancreatitis, went over SE's    For knee pain, refilling tramadol, she said she won't  gabapentin that close to other gabapentin rx anymore    Ordering mammo at [facility]    Resending referral to [facility] for pap smear    Ordering cologuard, fit kit never went through the mail apparently    The visit today included increased complexity associated with the care of an episodic problem, namely HTN, that was addressed and managed via longitudinal care.    RTC in 1 month, lexapro, BP, wegovy, HM        I encouraged the patient to take all medications as prescribed and to keep follow up appointments with their providers. ED precautions given for more concerning issues. Questions were invited and answered. Patient stated they had no other concerns. Follow up sooner if needed.     Harley Contreras MD  03/21/2025 9:39 AM

## 2025-03-31 ENCOUNTER — OFFICE VISIT (OUTPATIENT)
Dept: ORTHOPEDICS | Facility: CLINIC | Age: 60
End: 2025-03-31
Payer: COMMERCIAL

## 2025-03-31 VITALS — WEIGHT: 200.88 LBS | BODY MASS INDEX: 40.58 KG/M2

## 2025-03-31 DIAGNOSIS — M17.0 PRIMARY OSTEOARTHRITIS OF BOTH KNEES: Primary | ICD-10-CM

## 2025-03-31 PROCEDURE — 99999 PR PBB SHADOW E&M-EST. PATIENT-LVL II: CPT | Mod: PBBFAC,,, | Performed by: ORTHOPAEDIC SURGERY

## 2025-03-31 RX ORDER — TRIAMCINOLONE ACETONIDE 40 MG/ML
40 INJECTION, SUSPENSION INTRA-ARTICULAR; INTRAMUSCULAR
Status: DISCONTINUED | OUTPATIENT
Start: 2025-03-31 | End: 2025-03-31 | Stop reason: HOSPADM

## 2025-03-31 RX ADMIN — TRIAMCINOLONE ACETONIDE 40 MG: 40 INJECTION, SUSPENSION INTRA-ARTICULAR; INTRAMUSCULAR at 08:03

## 2025-03-31 NOTE — PROCEDURES
Large Joint Aspiration/Injection: R knee    Date/Time: 3/31/2025 8:45 AM    Performed by: Andrey Negron MD  Authorized by: Andrey Negron MD    Consent Done?:  Yes (Verbal)  Indications:  Pain  Site marked: the procedure site was marked    Timeout: prior to procedure the correct patient, procedure, and site was verified    Local anesthetic: Ropivicaine.  Anesthetic total (ml):  3      Details:  Needle Size:  20 G  Approach:  Anterolateral  Location:  Knee  Site:  R knee  Medications:  40 mg triamcinolone acetonide 40 mg/mL  Patient tolerance:  Patient tolerated the procedure well with no immediate complications

## 2025-03-31 NOTE — PROGRESS NOTES
Subjective:      Patient ID: Kaylie Madden is a 60 y.o. female.    Chief Complaint: Pain of the Left Knee and Pain of the Right Knee    HPI  The patient returns with the ongoing bilateral knee pain  ROS      Objective:    Ortho Exam     Constitutional:   Patient is alert  and oriented in no acute distress  HEENT:  normocephalic atraumatic; PERRL EOMI  Neck:  Supple without adenopathy  Cardiovascular:  Normal rate and rhythm  Pulmonary:  Normal respiratory effort normal chest wall expansion  Abdominal:  Nonprotuberant nondistended  Musculoskeletal:  Patient has a mildly antalgic gait  She has crepitus with range of motion diffuse joint line tenderness of both knees  Neurological:  No focal defect; cranial nerves 2-12 grossly intact  Psychiatric/behavioral:  Mood and behavior normal               My Radiographs Findings:    No new Radiographs  Assessment:       Encounter Diagnosis   Name Primary?    Primary osteoarthritis of both knees Yes         Plan:       After a verbal consent and sterile prep I injected her most symptomatic right knee today at her request.  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     Past Surgical History:   Procedure Laterality Date     SECTION      FOOT SURGERY         Current Medications[1]    Review of patient's allergies indicates:   Allergen Reactions    Metaxalone Nausea And Vomiting    Cephalexin Other (See Comments)    Corticosteroids (glucocorticoids) Other (See Comments)     rage       Family History   Problem Relation Name Age of Onset    Diabetes Mother      Hypertension Mother        Social History     Occupational History    Not on file   Tobacco Use    Smoking status: Never     Passive exposure: Never    Smokeless tobacco: Never   Substance and Sexual Activity    Alcohol use: No    Drug use: No    Sexual activity: Yes     Partners: Male     Birth control/protection: None            [1]   Current Outpatient Medications:     EScitalopram oxalate (LEXAPRO) 10 MG tablet, Take 1 tablet (10 mg total) by mouth once daily., Disp: 90 tablet, Rfl: 1    gabapentin (NEURONTIN) 600 MG tablet, Take 1 tablet (600 mg total) by mouth 3 (three) times daily., Disp: 90 tablet, Rfl: 5    ibuprofen (ADVIL,MOTRIN) 800 MG tablet, Take 800 mg by mouth 3 (three) times daily., Disp: , Rfl:     lisinopriL-hydrochlorothiazide (PRINZIDE,ZESTORETIC) 20-12.5 mg per tablet, Take 2 tablets by mouth once daily., Disp: 180 tablet, Rfl: 1    semaglutide, weight loss, (WEGOVY) 0.25 mg/0.5 mL PnIj, Inject 0.25 mg into the skin every 7 days., Disp: 2 mL, Rfl: 0    traMADoL (ULTRAM) 50 mg tablet, Take 1 tablet (50 mg total) by mouth every 6 (six) hours., Disp: 12 tablet, Rfl: 0

## 2025-03-31 NOTE — LETTER
March 31, 2025      [facility]  [address]  [place] [address]  Phone: [phone]  Fax: [phone]       Patient: Kaylie Madden   YOB: 1965  Date of Visit: 03/31/2025    To Whom It May Concern:    Hermann Madden  was at [facility] on 03/31/2025. The patient may return to work on 03/31/2025 with no restrictions. If you have any questions or concerns, or if I can be of further assistance, please do not hesitate to contact me.    Sincerely,    DENNY Kessler MD

## 2025-04-15 PROCEDURE — 82274 ASSAY TEST FOR BLOOD FECAL: CPT | Performed by: FAMILY MEDICINE

## 2025-06-23 ENCOUNTER — TELEPHONE (OUTPATIENT)
Dept: FAMILY MEDICINE | Facility: CLINIC | Age: 60
End: 2025-06-23
Payer: COMMERCIAL

## 2025-06-23 NOTE — TELEPHONE ENCOUNTER
Copied from CRM #[id number]. Topic: General Inquiry - Patient Advice  >> Jun 23, 2025 12:57 PM Alberta wrote:  Type: Needs Medical Advice  Who Called:  Patient   Symptoms (please be specific):    How long has patient had these symptoms:    Pharmacy name and phone #:    Best Call Back Number: [phone]  Additional Information: Patient is requesting a call back from the nurse for a sooner appt. due to excessive weight gain and swelling in feet and ankles.

## 2025-06-24 ENCOUNTER — TELEPHONE (OUTPATIENT)
Dept: FAMILY MEDICINE | Facility: CLINIC | Age: 60
End: 2025-06-24
Payer: COMMERCIAL

## 2025-06-24 NOTE — TELEPHONE ENCOUNTER
Copied from CRM #[id number]. Topic: General Inquiry - Return Call  >> Jun 23, 2025  5:46 PM Zina wrote:  Type:  Patient Returning Call    Who Called:Kaylie Madden [[id number]]    Who Left Message for Patient:SKIP Ann LPN    Does the patient know what this is regarding?:Scheduling     Would the patient rather a call back or a response via [facility]? Call back     Best Call Back Number:[phone]     Additional Information: Patient returning call

## 2025-06-25 ENCOUNTER — OFFICE VISIT (OUTPATIENT)
Dept: FAMILY MEDICINE | Facility: CLINIC | Age: 60
End: 2025-06-25
Payer: COMMERCIAL

## 2025-06-25 VITALS
HEART RATE: 70 BPM | DIASTOLIC BLOOD PRESSURE: 82 MMHG | SYSTOLIC BLOOD PRESSURE: 134 MMHG | OXYGEN SATURATION: 100 % | WEIGHT: 199.5 LBS | BODY MASS INDEX: 40.22 KG/M2 | RESPIRATION RATE: 18 BRPM | HEIGHT: 59 IN

## 2025-06-25 DIAGNOSIS — Z12.4 CERVICAL CANCER SCREENING: ICD-10-CM

## 2025-06-25 DIAGNOSIS — M25.562 CHRONIC PAIN OF LEFT KNEE: ICD-10-CM

## 2025-06-25 DIAGNOSIS — Z12.31 ENCOUNTER FOR SCREENING MAMMOGRAM FOR BREAST CANCER: ICD-10-CM

## 2025-06-25 DIAGNOSIS — I10 PRIMARY HYPERTENSION: Primary | ICD-10-CM

## 2025-06-25 DIAGNOSIS — F41.0 PANIC ATTACKS: ICD-10-CM

## 2025-06-25 DIAGNOSIS — R73.03 PREDIABETES: ICD-10-CM

## 2025-06-25 DIAGNOSIS — G89.29 CHRONIC PAIN OF LEFT KNEE: ICD-10-CM

## 2025-06-25 PROCEDURE — 1159F MED LIST DOCD IN RCRD: CPT | Mod: CPTII,S$GLB,, | Performed by: STUDENT IN AN ORGANIZED HEALTH CARE EDUCATION/TRAINING PROGRAM

## 2025-06-25 PROCEDURE — 3075F SYST BP GE 130 - 139MM HG: CPT | Mod: CPTII,S$GLB,, | Performed by: STUDENT IN AN ORGANIZED HEALTH CARE EDUCATION/TRAINING PROGRAM

## 2025-06-25 PROCEDURE — G2211 COMPLEX E/M VISIT ADD ON: HCPCS | Mod: S$GLB,,, | Performed by: STUDENT IN AN ORGANIZED HEALTH CARE EDUCATION/TRAINING PROGRAM

## 2025-06-25 PROCEDURE — 99214 OFFICE O/P EST MOD 30 MIN: CPT | Mod: S$GLB,,, | Performed by: STUDENT IN AN ORGANIZED HEALTH CARE EDUCATION/TRAINING PROGRAM

## 2025-06-25 PROCEDURE — 3079F DIAST BP 80-89 MM HG: CPT | Mod: CPTII,S$GLB,, | Performed by: STUDENT IN AN ORGANIZED HEALTH CARE EDUCATION/TRAINING PROGRAM

## 2025-06-25 PROCEDURE — 3008F BODY MASS INDEX DOCD: CPT | Mod: CPTII,S$GLB,, | Performed by: STUDENT IN AN ORGANIZED HEALTH CARE EDUCATION/TRAINING PROGRAM

## 2025-06-25 PROCEDURE — 4010F ACE/ARB THERAPY RXD/TAKEN: CPT | Mod: CPTII,S$GLB,, | Performed by: STUDENT IN AN ORGANIZED HEALTH CARE EDUCATION/TRAINING PROGRAM

## 2025-06-25 PROCEDURE — 3044F HG A1C LEVEL LT 7.0%: CPT | Mod: CPTII,S$GLB,, | Performed by: STUDENT IN AN ORGANIZED HEALTH CARE EDUCATION/TRAINING PROGRAM

## 2025-06-25 RX ORDER — TRAMADOL HYDROCHLORIDE 50 MG/1
50 TABLET, FILM COATED ORAL EVERY 6 HOURS
Qty: 12 EACH | Refills: 0 | Status: SHIPPED | OUTPATIENT
Start: 2025-06-25

## 2025-06-25 RX ORDER — BUSPIRONE HYDROCHLORIDE 10 MG/1
10 TABLET ORAL
Qty: 30 TABLET | Refills: 5 | Status: SHIPPED | OUTPATIENT
Start: 2025-06-25 | End: 2026-06-25

## 2025-06-25 RX ORDER — GABAPENTIN 600 MG/1
600 TABLET ORAL 3 TIMES DAILY
Qty: 90 TABLET | Refills: 5 | Status: SHIPPED | OUTPATIENT
Start: 2025-06-25

## 2025-06-25 NOTE — PROGRESS NOTES
"  [facility]    [facility]  [address], [address]  [place] [address]    Subjective     Patient ID: Kaylie Madden is a 60 y.o. female who comes to the clinic for a follow up visit.    Chief Complaint: Health Maintenance (Follow up)    Bilateral knee pain - has been a chronic issue but it's acutely worsened as of late. The left is worse than the right. She is on her feet all the time.     HTN - takes lisinopril-HCTZ 20-12.5mg twice daily      Anxiety - takes lexapro 10mg daily (hasn't taken it yet), no SI or HI     Prediabetes - A1c of 5.9, on no meds     Morbid obesity - BMI of 41     Chronic left knee pain - was scheduled to have left knee replacement in March but she had to reschedule. Takes gabapentin 600mg TID and PRN tramadol    ROS negative unless stated above       Objective     Vitals:    06/25/25 0814   BP: 134/82   BP Location: Right arm   Patient Position: Sitting   Pulse: 70   Resp: 18   SpO2: 100%   Weight: 90.5 kg (199 lb 8.3 oz)   Height: 4' 11" (1.499 m)        Wt Readings from Last 3 Encounters:   06/25/25 0814 90.5 kg (199 lb 8.3 oz)   03/31/25 0846 91.1 kg (200 lb 14.4 oz)   03/21/25 0934 93.4 kg (206 lb)        Physical Exam  Constitutional:       General: She is not in acute distress.     Appearance: Normal appearance. She is not ill-appearing.   HENT:      Head: Normocephalic and atraumatic.      Mouth/Throat:      Mouth: Mucous membranes are moist.   Eyes:      Extraocular Movements: Extraocular movements intact.      Pupils: Pupils are equal, round, and reactive to light.   Cardiovascular:      Rate and Rhythm: Normal rate.   Pulmonary:      Effort: Pulmonary effort is normal. No respiratory distress.   Musculoskeletal:         General: Normal range of motion.      Cervical back: Normal range of motion and neck supple.   Skin:     General: Skin is warm and dry.   Neurological:      General: No focal deficit present.      Mental Status: She " is alert and oriented to person, place, and time. Mental status is at baseline.   Psychiatric:         Mood and Affect: Mood normal.         Behavior: Behavior normal.         Thought Content: Thought content normal.         Current Outpatient Medications   Medication Instructions    busPIRone (BUSPAR) 10 mg, Oral, As needed (PRN)    gabapentin (NEURONTIN) 600 mg, Oral, 3 times daily    ibuprofen (ADVIL,MOTRIN) 800 mg, 3 times daily    lisinopriL-hydrochlorothiazide (PRINZIDE,ZESTORETIC) 20-12.5 mg per tablet 2 tablets, Oral, Daily    traMADoL (ULTRAM) 50 mg, Oral, Every 6 hours           Assessment and Plan     1. Primary hypertension    2. Prediabetes    3. Chronic pain of left knee  -     traMADoL (ULTRAM) 50 mg tablet; Take 1 tablet (50 mg total) by mouth every 6 (six) hours.  Dispense: 12 each; Refill: 0  -     gabapentin (NEURONTIN) 600 MG tablet; Take 1 tablet (600 mg total) by mouth 3 (three) times daily.  Dispense: 90 tablet; Refill: 5    4. Encounter for screening mammogram for breast cancer  -     Mammo Digital Screening Bilat w/ Edy (XPD); Future; Expected date: 06/25/2025    5. Cervical cancer screening  -     Ambulatory referral/consult to Obstetrics / Gynecology; Future; Expected date: 07/02/2025    6. Panic attacks  -     busPIRone (BUSPAR) 10 MG tablet; Take 1 tablet (10 mg total) by mouth as needed (panic attack).  Dispense: 30 tablet; Refill: 5        Here for follow up.    For anxiety, restarting lexapro, starting PRN buspar, she is to start therapy soon    For HTN, at goal, continue prinzide    For prediabetes, continue lifestyle measures    Reordering mammogram    Referring to Nanda [place] w/ [facility]    The visit today included increased complexity associated with the care of an episodic problem, namely prediabetes, that was addressed and managed via longitudinal care.    RTC in 1 month, anxiety, HM items        I encouraged the patient to take all medications as prescribed and to keep follow up  appointments with their providers. ED precautions given for more concerning issues. Questions were invited and answered. Patient stated they had no other concerns. Follow up sooner if needed.     Harley Contreras MD  06/25/2025 8:09 AM

## 2025-06-26 ENCOUNTER — TELEPHONE (OUTPATIENT)
Dept: ORTHOPEDICS | Facility: CLINIC | Age: 60
End: 2025-06-26
Payer: COMMERCIAL

## 2025-06-26 DIAGNOSIS — M17.0 PRIMARY OSTEOARTHRITIS OF BOTH KNEES: Primary | ICD-10-CM

## 2025-06-26 NOTE — TELEPHONE ENCOUNTER
----- Message from Nurse Maldonado sent at 6/25/2025 12:40 PM CDT -----  Contact: pt    ----- Message -----  From: Mony Jane MA  Sent: 6/25/2025   9:05 AM CDT  To: Jamil Balderas Staff    Pt calling on information, timing for gel or steroid injection, bilateral knees   Call back

## 2025-06-26 NOTE — TELEPHONE ENCOUNTER
Spoke with pt advised the steroid injection is every 3-4 months and the gel injection is 6 months to a year. Pt stated the gel injections were very painful and would like steroid injection in left knee- B knee pain.   Assisted in scheduling xray for bilateral knee and appointment for bilateral knee pain left is worse- injection request steroid.

## 2025-06-30 ENCOUNTER — HOSPITAL ENCOUNTER (OUTPATIENT)
Dept: RADIOLOGY | Facility: HOSPITAL | Age: 60
Discharge: HOME | End: 2025-06-30
Attending: ORTHOPAEDIC SURGERY
Payer: COMMERCIAL

## 2025-06-30 ENCOUNTER — OFFICE VISIT (OUTPATIENT)
Dept: ORTHOPEDICS | Facility: CLINIC | Age: 60
End: 2025-06-30
Payer: COMMERCIAL

## 2025-06-30 VITALS — WEIGHT: 200.63 LBS | BODY MASS INDEX: 40.44 KG/M2 | HEIGHT: 59 IN

## 2025-06-30 DIAGNOSIS — M17.0 PRIMARY OSTEOARTHRITIS OF BOTH KNEES: ICD-10-CM

## 2025-06-30 DIAGNOSIS — M17.0 PRIMARY OSTEOARTHRITIS OF BOTH KNEES: Primary | ICD-10-CM

## 2025-06-30 PROCEDURE — 3044F HG A1C LEVEL LT 7.0%: CPT | Mod: CPTII,S$GLB,, | Performed by: ORTHOPAEDIC SURGERY

## 2025-06-30 PROCEDURE — 73562 X-RAY EXAM OF KNEE 3: CPT | Mod: TC,50,PN

## 2025-06-30 PROCEDURE — 99215 OFFICE O/P EST HI 40 MIN: CPT | Mod: 25,S$GLB,, | Performed by: ORTHOPAEDIC SURGERY

## 2025-06-30 PROCEDURE — 4010F ACE/ARB THERAPY RXD/TAKEN: CPT | Mod: CPTII,S$GLB,, | Performed by: ORTHOPAEDIC SURGERY

## 2025-06-30 PROCEDURE — 20610 DRAIN/INJ JOINT/BURSA W/O US: CPT | Mod: LT,S$GLB,, | Performed by: ORTHOPAEDIC SURGERY

## 2025-06-30 PROCEDURE — 3008F BODY MASS INDEX DOCD: CPT | Mod: CPTII,S$GLB,, | Performed by: ORTHOPAEDIC SURGERY

## 2025-06-30 PROCEDURE — 1160F RVW MEDS BY RX/DR IN RCRD: CPT | Mod: CPTII,S$GLB,, | Performed by: ORTHOPAEDIC SURGERY

## 2025-06-30 PROCEDURE — 1159F MED LIST DOCD IN RCRD: CPT | Mod: CPTII,S$GLB,, | Performed by: ORTHOPAEDIC SURGERY

## 2025-06-30 PROCEDURE — 73562 X-RAY EXAM OF KNEE 3: CPT | Mod: 26,50,, | Performed by: RADIOLOGY

## 2025-06-30 PROCEDURE — 99999 PR PBB SHADOW E&M-EST. PATIENT-LVL III: CPT | Mod: PBBFAC,,, | Performed by: ORTHOPAEDIC SURGERY

## 2025-06-30 RX ORDER — TRIAMCINOLONE ACETONIDE 40 MG/ML
40 INJECTION, SUSPENSION INTRA-ARTICULAR; INTRAMUSCULAR
Status: DISCONTINUED | OUTPATIENT
Start: 2025-06-30 | End: 2025-06-30 | Stop reason: HOSPADM

## 2025-06-30 RX ADMIN — TRIAMCINOLONE ACETONIDE 40 MG: 40 INJECTION, SUSPENSION INTRA-ARTICULAR; INTRAMUSCULAR at 08:06

## 2025-06-30 NOTE — PROGRESS NOTES
Subjective:      Patient ID: Kaylie Madden is a 60 y.o. female.    Chief Complaint: Knee Pain (Bilateral - left is worse today )    HPI  The patient presents with ongoing/recurrent left greater than right knee pain.  She did well for a while after previous injection but but pain has been progressive over the last several months.  Recently seen by her PCP and referred back to us for further evaluation.  She states she is to the point that she would like to consider knee replacement.  She states she would need to wait until after the school year to be able to do so.  Pain is significantly interfering with activities of daily living.  ROS      Objective:    Ortho Exam     Constitutional:   Patient is alert  and oriented in no acute distress  HEENT:  normocephalic atraumatic; PERRL EOMI  Neck:  Supple without adenopathy  Cardiovascular:  Normal rate and rhythm  Pulmonary:  Normal respiratory effort normal chest wall expansion  Abdominal:  Nonprotuberant nondistended  Musculoskeletal:  Patient has an ongoing mildly antalgic gait  Difficulty with full extension today maybe a trace effusion  Diffuse medial joint line tenderness  Neurological:  No focal defect; cranial nerves 2-12 grossly intact  Psychiatric/behavioral:  Mood and behavior normal           My Radiographs Findings:    Radiographs consistent with severe and progressive degenerative changes of the medial patellofemoral compartment  Assessment:       Encounter Diagnosis   Name Primary?    Primary osteoarthritis of both knees Yes         Plan:       I have discussed medical condition treatment options with her at length.  She states she really does not believe that she can deal with the pain that she is having for another 6 weeks until school is out.  I have offered an injection today but I have explained that we would result in a requisite 3 to four-month weight before knee replacement.  She states understanding in persist with her desire for an injection  today.  That was done today after a verbal consent and sterile prep.  We have discussed ice elevation compressive wrapping NSAIDs as needed.  I have told her that we could consider viscosupplementation in 6 weeks if symptoms fail to improve.        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 2023     Past Surgical History:   Procedure Laterality Date     SECTION      FOOT SURGERY         Current Medications[1]    Review of patient's allergies indicates:   Allergen Reactions    Metaxalone Nausea And Vomiting    Cephalexin Other (See Comments)    Corticosteroids (glucocorticoids) Other (See Comments)     rage       Family History   Problem Relation Name Age of Onset    Diabetes Mother      Hypertension Mother       Social History     Occupational History    Not on file   Tobacco Use    Smoking status: Never     Passive exposure: Never    Smokeless tobacco: Never   Substance and Sexual Activity    Alcohol use: No    Drug use: No    Sexual activity: Yes     Partners: Male     Birth control/protection: None            [1]   Current Outpatient Medications:     busPIRone (BUSPAR) 10 MG tablet, Take 1 tablet (10 mg total) by mouth as needed (panic attack)., Disp: 30 tablet, Rfl: 5    gabapentin (NEURONTIN) 600 MG tablet, Take 1 tablet (600 mg total) by mouth 3 (three) times daily., Disp: 90 tablet, Rfl: 5    ibuprofen (ADVIL,MOTRIN) 800 MG tablet, Take 800 mg by mouth 3 (three) times daily., Disp: , Rfl:     lisinopriL-hydrochlorothiazide (PRINZIDE,ZESTORETIC) 20-12.5 mg per tablet, Take 2 tablets by mouth once daily., Disp: 180 tablet, Rfl: 1    traMADoL (ULTRAM) 50 mg tablet, Take 1 tablet (50 mg total) by mouth every 6 (six) hours., Disp: 12 each, Rfl: 0

## 2025-06-30 NOTE — PROCEDURES
Large Joint Aspiration/Injection: L knee    Date/Time: 6/30/2025 8:15 AM    Performed by: Andrey Negron MD  Authorized by: Andrey Negron MD    Consent Done?:  Yes (Verbal)  Indications:  Pain  Site marked: the procedure site was marked    Timeout: prior to procedure the correct patient, procedure, and site was verified    Local anesthetic:  Lidocaine 1% without epinephrine and bupivacaine 0.25% without epinephrine  Anesthetic total (ml):  6      Details:  Needle Size:  20 G  Approach:  Anterolateral  Location:  Knee  Site:  L knee  Medications:  40 mg triamcinolone acetonide 40 mg/mL  Patient tolerance:  Patient tolerated the procedure well with no immediate complications

## 2025-08-13 ENCOUNTER — PATIENT MESSAGE (OUTPATIENT)
Dept: ADMINISTRATIVE | Facility: HOSPITAL | Age: 60
End: 2025-08-13
Payer: COMMERCIAL

## 2025-08-22 ENCOUNTER — OFFICE VISIT (OUTPATIENT)
Dept: FAMILY MEDICINE | Facility: CLINIC | Age: 60
End: 2025-08-22
Payer: COMMERCIAL

## 2025-08-22 VITALS
SYSTOLIC BLOOD PRESSURE: 130 MMHG | BODY MASS INDEX: 42.93 KG/M2 | DIASTOLIC BLOOD PRESSURE: 75 MMHG | RESPIRATION RATE: 20 BRPM | WEIGHT: 212.94 LBS | HEART RATE: 20 BPM | OXYGEN SATURATION: 99 % | HEIGHT: 59 IN

## 2025-08-22 DIAGNOSIS — F41.9 ANXIETY: Primary | ICD-10-CM

## 2025-08-22 DIAGNOSIS — Z12.31 ENCOUNTER FOR SCREENING MAMMOGRAM FOR BREAST CANCER: ICD-10-CM

## 2025-08-22 DIAGNOSIS — G89.29 CHRONIC PAIN OF LEFT KNEE: ICD-10-CM

## 2025-08-22 DIAGNOSIS — Z12.4 CERVICAL CANCER SCREENING: ICD-10-CM

## 2025-08-22 DIAGNOSIS — M25.562 CHRONIC PAIN OF LEFT KNEE: ICD-10-CM

## 2025-08-22 DIAGNOSIS — R73.03 PREDIABETES: ICD-10-CM

## 2025-08-22 DIAGNOSIS — M79.89 LEG SWELLING: ICD-10-CM

## 2025-08-22 RX ORDER — TRAMADOL HYDROCHLORIDE 50 MG/1
50 TABLET, FILM COATED ORAL EVERY 6 HOURS
Qty: 12 EACH | Refills: 0 | Status: SHIPPED | OUTPATIENT
Start: 2025-08-22